# Patient Record
Sex: MALE | Race: OTHER | Employment: PART TIME | URBAN - METROPOLITAN AREA
[De-identification: names, ages, dates, MRNs, and addresses within clinical notes are randomized per-mention and may not be internally consistent; named-entity substitution may affect disease eponyms.]

---

## 2019-10-19 ENCOUNTER — HOSPITAL ENCOUNTER (OUTPATIENT)
Dept: GENERAL RADIOLOGY | Age: 59
Discharge: HOME | End: 2019-10-21

## 2019-10-19 DIAGNOSIS — A15.9 TB (TUBERCULOSIS): ICD-10-CM

## 2019-10-19 PROCEDURE — 71046 X-RAY EXAM CHEST 2 VIEWS: CPT

## 2021-02-20 ENCOUNTER — APPOINTMENT (OUTPATIENT)
Dept: GENERAL RADIOLOGY | Age: 61
End: 2021-02-20

## 2021-02-20 ENCOUNTER — HOSPITAL ENCOUNTER (EMERGENCY)
Age: 61
Discharge: HOME | End: 2021-02-20
Attending: EMERGENCY MEDICINE

## 2021-02-20 VITALS
TEMPERATURE: 97.9 F | SYSTOLIC BLOOD PRESSURE: 122 MMHG | DIASTOLIC BLOOD PRESSURE: 67 MMHG | RESPIRATION RATE: 20 BRPM | HEART RATE: 71 BPM | OXYGEN SATURATION: 99 % | WEIGHT: 200 LBS

## 2021-02-20 DIAGNOSIS — J02.0 STREPTOCOCCAL SORE THROAT: ICD-10-CM

## 2021-02-20 DIAGNOSIS — J40 BRONCHITIS: Primary | ICD-10-CM

## 2021-02-20 LAB — STREP GRP A PCR: POSITIVE

## 2021-02-20 PROCEDURE — 99283 EMERGENCY DEPT VISIT LOW MDM: CPT

## 2021-02-20 PROCEDURE — 71046 X-RAY EXAM CHEST 2 VIEWS: CPT

## 2021-02-20 PROCEDURE — 87651 STREP A DNA AMP PROBE: CPT

## 2021-02-20 PROCEDURE — 6370000000 HC RX 637 (ALT 250 FOR IP): Performed by: EMERGENCY MEDICINE

## 2021-02-20 RX ORDER — AZITHROMYCIN 250 MG/1
TABLET, FILM COATED ORAL
Qty: 1 PACKET | Refills: 0 | Status: SHIPPED | OUTPATIENT
Start: 2021-02-20 | End: 2021-02-20

## 2021-02-20 RX ORDER — AMOXICILLIN AND CLAVULANATE POTASSIUM 875; 125 MG/1; MG/1
1 TABLET, FILM COATED ORAL 2 TIMES DAILY
Qty: 14 TABLET | Refills: 0 | Status: SHIPPED | OUTPATIENT
Start: 2021-02-20 | End: 2021-02-27

## 2021-02-20 RX ORDER — ONDANSETRON 4 MG/1
4 TABLET, ORALLY DISINTEGRATING ORAL ONCE
Status: COMPLETED | OUTPATIENT
Start: 2021-02-20 | End: 2021-02-20

## 2021-02-20 RX ADMIN — ONDANSETRON 4 MG: 4 TABLET, ORALLY DISINTEGRATING ORAL at 13:26

## 2021-02-20 ASSESSMENT — ENCOUNTER SYMPTOMS
SORE THROAT: 1
BACK PAIN: 0
COUGH: 1
ABDOMINAL PAIN: 0
SHORTNESS OF BREATH: 0
DIARRHEA: 0
NAUSEA: 0
VOMITING: 0

## 2021-02-20 ASSESSMENT — PAIN SCALES - GENERAL: PAINLEVEL_OUTOF10: 5

## 2021-02-20 ASSESSMENT — PAIN DESCRIPTION - LOCATION: LOCATION: THROAT

## 2021-02-20 NOTE — ED PROVIDER NOTES
[facility] ED  eMERGENCYdEPARTMENT eNCOUnter      Pt Name: Luli Kc  MRN: [id number]  [place] 1960  Date of evaluation: 2/20/2021  Bernardo Fields MD    CHIEF COMPLAINT           HPI  Luli Kc is a 64 y.o. male per chart review has no pmh presents to the ED with cough, sore throat, nausea. Pt notes gradual onset, moderate, constant, sore throat x 2 days. +Productive cough with yellow sputum. +N/-v.  Pt denies fever, cp, sob, dysuria, diarrhea. ROS  Review of Systems   Constitutional: Negative for activity change, chills and fever. HENT: Positive for sore throat. Negative for ear pain. Eyes: Negative for visual disturbance. Respiratory: Positive for cough. Negative for shortness of breath. Cardiovascular: Negative for chest pain, palpitations and leg swelling. Gastrointestinal: Negative for abdominal pain, diarrhea, nausea and vomiting. Genitourinary: Negative for dysuria. Musculoskeletal: Negative for back pain. Skin: Negative for rash. Neurological: Negative for dizziness and weakness. Except as noted above the remainder of the review of systems was reviewed and negative. PAST MEDICAL HISTORY   History reviewed. No pertinent past medical history. SURGICAL HISTORY     History reviewed. No pertinent surgical history. CURRENTMEDICATIONS       Previous Medications    No medications on file       ALLERGIES     Patient has no known allergies. FAMILY HISTORY     History reviewed. No pertinent family history.        SOCIAL HISTORY       Social History     Socioeconomic History    Marital status: Legally      Spouse name: None    Number of children: None    Years of education: None    Highest education level: None   Occupational History    None   Social Needs    Financial resource strain: None    Food insecurity     Worry: None     Inability: None    Transportation needs     Medical: None     Non-medical: None   Tobacco to person, place, and time. Psychiatric:         Mood and Affect: Mood normal.           MDM  65 yo male presents to the ED with sore throat, cough, nausea. Pt is afebrile, hemodynamically stable. Pt given PO zofran in the ED. Strep positive. CXR negative. Pt reassessed and feels much better. Pt able to tolerate PO apple juice. Pt educated about strep and bronchitis. Pt smokes. Pt given strep and bronchitis warning signs and will f/u with pcp. Pt understands plan. FINAL IMPRESSION      1. Bronchitis    2.  Acute pharyngitis, unspecified etiology          DISPOSITION/PLAN   DISPOSITION Decision To Discharge 02/20/2021 02:26:34 PM        DISCHARGE MEDICATIONS:  [unfilled]         Jaqueline Sosa MD(electronically signed)  Attending Emergency Physician            Jaqueline Sosa MD  02/20/21 [address], [place]  02/20/21 8949

## 2021-10-02 ENCOUNTER — HOSPITAL ENCOUNTER (INPATIENT)
Age: 61
LOS: 6 days | Discharge: HOME | DRG: 885 | End: 2021-10-08
Attending: PSYCHIATRY & NEUROLOGY | Admitting: PSYCHIATRY & NEUROLOGY
Payer: COMMERCIAL

## 2021-10-02 DIAGNOSIS — F33.3 SEVERE EPISODE OF RECURRENT MAJOR DEPRESSIVE DISORDER, WITH PSYCHOTIC FEATURES (HCC): Primary | ICD-10-CM

## 2021-10-02 DIAGNOSIS — F14.10 NONDEPENDENT COCAINE ABUSE (HCC): ICD-10-CM

## 2021-10-02 PROBLEM — F32.3 MAJOR DEPRESSION WITH PSYCHOTIC FEATURES (HCC): Status: ACTIVE | Noted: 2021-10-02

## 2021-10-02 LAB
ACETAMINOPHEN LEVEL: <5 UG/ML (ref 10–30)
ALBUMIN SERPL-MCNC: 4.2 G/DL (ref 3.5–4.6)
ALP BLD-CCNC: 90 U/L (ref 35–104)
ALT SERPL-CCNC: 19 U/L (ref 0–41)
AMPHETAMINE SCREEN, URINE: ABNORMAL
ANION GAP SERPL CALCULATED.3IONS-SCNC: 10 MEQ/L (ref 9–15)
AST SERPL-CCNC: 19 U/L (ref 0–40)
BACTERIA: NEGATIVE /HPF
BARBITURATE SCREEN URINE: ABNORMAL
BASOPHILS ABSOLUTE: 0.1 K/UL (ref 0–0.2)
BASOPHILS RELATIVE PERCENT: 0.6 %
BENZODIAZEPINE SCREEN, URINE: ABNORMAL
BILIRUB SERPL-MCNC: 0.6 MG/DL (ref 0.2–0.7)
BILIRUBIN URINE: NEGATIVE
BLOOD, URINE: NEGATIVE
BUN BLDV-MCNC: 18 MG/DL (ref 6–20)
CALCIUM SERPL-MCNC: 9 MG/DL (ref 8.5–9.9)
CANNABINOID SCREEN URINE: ABNORMAL
CHLORIDE BLD-SCNC: 102 MEQ/L (ref 95–107)
CHOLESTEROL, TOTAL: 157 MG/DL (ref 0–199)
CK MB: 5.6 NG/ML (ref 0–6.7)
CLARITY: CLEAR
CO2: 25 MEQ/L (ref 20–31)
COCAINE METABOLITE SCREEN URINE: POSITIVE
COLOR: ABNORMAL
CREAT SERPL-MCNC: 1.19 MG/DL (ref 0.7–1.2)
CREATINE KINASE-MB INDEX: 2.8 % (ref 0–3.5)
EOSINOPHILS ABSOLUTE: 0.1 K/UL (ref 0–0.7)
EOSINOPHILS RELATIVE PERCENT: 0.5 %
EPITHELIAL CELLS, UA: NORMAL /HPF (ref 0–5)
ETHANOL PERCENT: NORMAL G/DL
ETHANOL: <10 MG/DL (ref 0–0.08)
GFR AFRICAN AMERICAN: >60
GFR NON-AFRICAN AMERICAN: >60
GLOBULIN: 3.1 G/DL (ref 2.3–3.5)
GLUCOSE BLD-MCNC: 107 MG/DL (ref 70–99)
GLUCOSE URINE: NEGATIVE MG/DL
HCT VFR BLD CALC: 46.2 % (ref 42–52)
HDLC SERPL-MCNC: 60 MG/DL (ref 40–59)
HEMOGLOBIN: 15.3 G/DL (ref 14–18)
HYALINE CASTS: NORMAL /HPF (ref 0–5)
KETONES, URINE: 15 MG/DL
LDL CHOLESTEROL CALCULATED: 91 MG/DL (ref 0–129)
LEUKOCYTE ESTERASE, URINE: ABNORMAL
LYMPHOCYTES ABSOLUTE: 2 K/UL (ref 1–4.8)
LYMPHOCYTES RELATIVE PERCENT: 16 %
Lab: ABNORMAL
MCH RBC QN AUTO: 30.2 PG (ref 27–31.3)
MCHC RBC AUTO-ENTMCNC: 33.1 % (ref 33–37)
MCV RBC AUTO: 91.1 FL (ref 80–100)
METHADONE SCREEN, URINE: ABNORMAL
MONOCYTES ABSOLUTE: 1.2 K/UL (ref 0.2–0.8)
MONOCYTES RELATIVE PERCENT: 9.5 %
NEUTROPHILS ABSOLUTE: 9.4 K/UL (ref 1.4–6.5)
NEUTROPHILS RELATIVE PERCENT: 73.4 %
NITRITE, URINE: NEGATIVE
OPIATE SCREEN URINE: ABNORMAL
OXYCODONE URINE: ABNORMAL
PDW BLD-RTO: 14.5 % (ref 11.5–14.5)
PH UA: 5 (ref 5–9)
PHENCYCLIDINE SCREEN URINE: ABNORMAL
PLATELET # BLD: 303 K/UL (ref 130–400)
POTASSIUM SERPL-SCNC: 3.7 MEQ/L (ref 3.4–4.9)
PROPOXYPHENE SCREEN: ABNORMAL
PROTEIN UA: ABNORMAL MG/DL
RBC # BLD: 5.07 M/UL (ref 4.7–6.1)
RBC UA: NORMAL /HPF (ref 0–5)
SALICYLATE, SERUM: <0.3 MG/DL (ref 15–30)
SARS-COV-2, NAAT: NOT DETECTED
SODIUM BLD-SCNC: 137 MEQ/L (ref 135–144)
SPECIFIC GRAVITY UA: 1.03 (ref 1–1.03)
TOTAL CK: 202 U/L (ref 0–190)
TOTAL PROTEIN: 7.3 G/DL (ref 6.3–8)
TRIGL SERPL-MCNC: 32 MG/DL (ref 0–150)
URINE REFLEX TO CULTURE: ABNORMAL
UROBILINOGEN, URINE: 1 E.U./DL
WBC # BLD: 12.7 K/UL (ref 4.8–10.8)
WBC UA: NORMAL /HPF (ref 0–5)

## 2021-10-02 PROCEDURE — 82550 ASSAY OF CK (CPK): CPT

## 2021-10-02 PROCEDURE — 85025 COMPLETE CBC W/AUTO DIFF WBC: CPT

## 2021-10-02 PROCEDURE — 99285 EMERGENCY DEPT VISIT HI MDM: CPT

## 2021-10-02 PROCEDURE — 82077 ASSAY SPEC XCP UR&BREATH IA: CPT

## 2021-10-02 PROCEDURE — 1240000000 HC EMOTIONAL WELLNESS R&B

## 2021-10-02 PROCEDURE — 6370000000 HC RX 637 (ALT 250 FOR IP): Performed by: PHYSICIAN ASSISTANT

## 2021-10-02 PROCEDURE — 80307 DRUG TEST PRSMV CHEM ANLYZR: CPT

## 2021-10-02 PROCEDURE — 81001 URINALYSIS AUTO W/SCOPE: CPT

## 2021-10-02 PROCEDURE — 80179 DRUG ASSAY SALICYLATE: CPT

## 2021-10-02 PROCEDURE — 93005 ELECTROCARDIOGRAM TRACING: CPT | Performed by: PHYSICIAN ASSISTANT

## 2021-10-02 PROCEDURE — 80143 DRUG ASSAY ACETAMINOPHEN: CPT

## 2021-10-02 PROCEDURE — 80061 LIPID PANEL: CPT

## 2021-10-02 PROCEDURE — 82553 CREATINE MB FRACTION: CPT

## 2021-10-02 PROCEDURE — 80053 COMPREHEN METABOLIC PANEL: CPT

## 2021-10-02 PROCEDURE — 36415 COLL VENOUS BLD VENIPUNCTURE: CPT

## 2021-10-02 RX ORDER — HALOPERIDOL 5 MG/ML
5 INJECTION INTRAMUSCULAR EVERY 6 HOURS PRN
Status: DISCONTINUED | OUTPATIENT
Start: 2021-10-02 | End: 2021-10-08 | Stop reason: HOSPADM

## 2021-10-02 RX ORDER — ACETAMINOPHEN 325 MG/1
650 TABLET ORAL EVERY 4 HOURS PRN
Status: DISCONTINUED | OUTPATIENT
Start: 2021-10-02 | End: 2021-10-08 | Stop reason: HOSPADM

## 2021-10-02 RX ORDER — HALOPERIDOL 5 MG
5 TABLET ORAL EVERY 6 HOURS PRN
Status: DISCONTINUED | OUTPATIENT
Start: 2021-10-02 | End: 2021-10-08 | Stop reason: HOSPADM

## 2021-10-02 RX ORDER — NICOTINE 21 MG/24HR
1 PATCH, TRANSDERMAL 24 HOURS TRANSDERMAL DAILY
Status: DISCONTINUED | OUTPATIENT
Start: 2021-10-02 | End: 2021-10-08 | Stop reason: HOSPADM

## 2021-10-02 RX ORDER — HYDROXYZINE HYDROCHLORIDE 50 MG/ML
50 INJECTION, SOLUTION INTRAMUSCULAR EVERY 6 HOURS PRN
Status: DISCONTINUED | OUTPATIENT
Start: 2021-10-02 | End: 2021-10-08 | Stop reason: HOSPADM

## 2021-10-02 RX ORDER — POLYETHYLENE GLYCOL 3350 17 G/17G
17 POWDER, FOR SOLUTION ORAL DAILY PRN
Status: DISCONTINUED | OUTPATIENT
Start: 2021-10-02 | End: 2021-10-08 | Stop reason: HOSPADM

## 2021-10-02 RX ORDER — LORAZEPAM 1 MG/1
1 TABLET ORAL ONCE
Status: COMPLETED | OUTPATIENT
Start: 2021-10-02 | End: 2021-10-02

## 2021-10-02 RX ORDER — HYDROXYZINE PAMOATE 50 MG/1
50 CAPSULE ORAL EVERY 6 HOURS PRN
Status: DISCONTINUED | OUTPATIENT
Start: 2021-10-02 | End: 2021-10-08 | Stop reason: HOSPADM

## 2021-10-02 RX ORDER — TRAZODONE HYDROCHLORIDE 50 MG/1
50 TABLET ORAL NIGHTLY PRN
Status: DISCONTINUED | OUTPATIENT
Start: 2021-10-02 | End: 2021-10-04

## 2021-10-02 RX ADMIN — LORAZEPAM 1 MG: 1 TABLET ORAL at 15:03

## 2021-10-02 ASSESSMENT — SLEEP AND FATIGUE QUESTIONNAIRES
SLEEP PATTERN: NORMAL
DO YOU HAVE DIFFICULTY SLEEPING: NO
AVERAGE NUMBER OF SLEEP HOURS: 6
DO YOU USE A SLEEP AID: NO

## 2021-10-02 ASSESSMENT — ENCOUNTER SYMPTOMS
NAUSEA: 0
CHOKING: 0
VOMITING: 0
SORE THROAT: 0
RHINORRHEA: 0
EYE DISCHARGE: 0
COUGH: 0
DIARRHEA: 0
SHORTNESS OF BREATH: 0
ABDOMINAL DISTENTION: 0
COLOR CHANGE: 0
ABDOMINAL PAIN: 0
CONSTIPATION: 0

## 2021-10-02 ASSESSMENT — PATIENT HEALTH QUESTIONNAIRE - PHQ9
SUM OF ALL RESPONSES TO PHQ QUESTIONS 1-9: 14
SUM OF ALL RESPONSES TO PHQ QUESTIONS 1-9: 14

## 2021-10-02 NOTE — ED NOTES
No one is available to take a report or give a bed on [facility] in report for the unit will need to call for a bed and a report on pt.      Lory Melgar RN  10/02/21 1910

## 2021-10-02 NOTE — ED NOTES
Provisional Diagnosis:  Substance Induced Mood D/O  Psychosis Unspecified      Psychosocial and Contextual Factors:  Pt was told he had to leave his apartment yesterday, the landlord Pt states he left all of his belongings there, \"I didn't have that much to begin with he the landlord told me to leave, I hadn't paid my rent in a month. \"  \"I left everything there, I just wanted to kill myself after I left my apartment. \"  \"I had lived there for two and half years. \"  \"I graduated from [employer] in [place] in 1982. \"  No other schooling, no . 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 \"  Pt is  from his wife for the past 10 years. \"  Pt has no children. C-SSRS Summary:     Patient: C-SSRS Suicide Screening  1) Within the past month, have you wished you were dead or wished you could go to sleep and not wake up? : Yes  2) Have you actually had any thoughts of killing yourself? : Yes  3) Have you been thinking about how you might kill yourself? : Yes  4) Have you had these thoughts and had some intention of acting on them? : Yes  5) Have you started to work out or worked out the details of how to kill yourself? Do you intend to carry out this plan? : Yes (\"I have suicidal thoughts for a month on and off and past week I thought about cutting my wrists. \"  \"I didn't cut myself no but wanted to today. \")  6) Have you ever done anything, started to do anything, or prepared to do anything to end your life?: Yes (\"Ten years ago I was on the bridge and was going to jump, God stopped me, I have had two other attempts before over many more than 10 years ago. \"  \"My last attempt was over 10 years. \")  Did this occur within the past 3 months? : No    Family: Supportive family per pt    Agency: Past H/O psych services 7 years ago no psych hospitalizations per pt          Abuse Assessment  Physical Abuse:

## 2021-10-02 NOTE — ED NOTES
3 [facility] called for bed assignment and report. Berna Chery RN states will call back.         Joslyn Salomon RN  10/02/21 1919

## 2021-10-02 NOTE — ED NOTES
Chuy ECHAVARRIA at bedside. Patient pink slipped by Christine AGUERO.       Madelyn Houser RN  10/02/21 6900

## 2021-10-02 NOTE — ED NOTES
Informed pt that we need urine. Pt cooperated.  Urine collected     Patrciio Martinez [facility]  10/02/21 8349

## 2021-10-02 NOTE — ED NOTES
Georgianne Cap was given to the pt at the bedside, pt is quiet and cooperative with no problems and no C/O any kind expressed. Pt has even respirations, no cough, no respiratory distress, and no SOB noted.      John Darby RN  10/02/21 3431

## 2021-10-02 NOTE — ED NOTES
Talked with Dr. Nenita Cooper reviewed his arrival to ER, possible loss of his job was advised he had to leave his apartment by the landlord, reviewed his labs no home med's, no alcohol abuse, uses crack and has for years on and off, he is with S/I with a plan to cut his wrist, hearing voices. Reviewed pt's EKG, labs, and no home medications  Previous 3 suicide attempts last was 10 years ago and was going to jump off of a bridge, today wanting to cut his wrists. Per Dr. Nenita Cooper pt can be admitted to [facility] DX Major Depression.   No home medications, PRN's admit on a pink sheet     Dai Rosen RN  10/02/21 9369

## 2021-10-02 NOTE — ED NOTES
Report on the pt given to Zulema Quiroga RN regarding orders for the pt's admit to [facility] but needs a bed and report on pt needs given     John Darby RN  10/02/21 1925

## 2021-10-02 NOTE — ED NOTES
Pt was cooperative with lab and -ER staff were in the [facility] with lab staff. Pt was given a lunch at the bedside.      Tosin Kaplan RN  10/02/21 9097

## 2021-10-02 NOTE — ED NOTES
Pt is in the TV area watching TV quiet and cooperative ate well for his dinner drinking fluids     Shakir Webb RN  10/02/21 7791

## 2021-10-02 NOTE — ED NOTES
Explained COVID to the pt and completed his test and sent to the lab.   Pt was cooperative with completing the test.     Duane Olsen RN  10/02/21 0405

## 2021-10-02 NOTE — ED TRIAGE NOTES
Patient arrived via EMS with suicidal thoughts and drug addiction. Patient states that he is upset about his drug use. States that he smoked \"crack\" approximately one hour ago. Patient called EMS for suicidal ideations. Patient had a knife in his possession when squad arrived. Patient states that he thought about harming himself.

## 2021-10-02 NOTE — ED NOTES
Gave Dr Kelly Carter ambulance form to sign and disposition with DX:  Advised the pt is still waiting for [facility] to transport the pt to [facility], RN  10/02/21 0605

## 2021-10-02 NOTE — ED PROVIDER NOTES
constipation, diarrhea, nausea and vomiting. Genitourinary: Negative for difficulty urinating and dysuria. Musculoskeletal: Negative for arthralgias. Skin: Negative for color change. Neurological: Negative for dizziness, syncope, numbness and headaches. Psychiatric/Behavioral: Positive for suicidal ideas. Negative for agitation and confusion. Except as noted above the remainder of the review of systems was reviewed and negative. PAST MEDICAL HISTORY     Past Medical History:   Diagnosis Date    Psychiatric problem          SURGICALHISTORY     History reviewed. No pertinent surgical history. CURRENT MEDICATIONS       There are no discharge medications for this patient. ALLERGIES     Patient has no known allergies.     FAMILY HISTORY       Family History   Problem Relation Age of Onset    No Known Problems Mother     No Known Problems Father     No Known Problems Sister     No Known Problems Brother     No Known Problems Maternal Aunt     No Known Problems Maternal Uncle     No Known Problems Paternal Aunt     No Known Problems Paternal Uncle     No Known Problems Maternal Grandmother     No Known Problems Maternal Grandfather     No Known Problems Paternal Grandmother     No Known Problems Paternal Grandfather     No Known Problems Maternal Cousin     No Known Problems Paternal Cousin     No Known Problems Other           SOCIAL HISTORY       Social History     Socioeconomic History    Marital status: Legally      Spouse name: None    Number of children: 0    Years of education: 15    Highest education level: None   Occupational History    None   Tobacco Use    Smoking status: Current Every Day Smoker     Packs/day: 0.50     Types: Cigarettes    Smokeless tobacco: Never Used   Vaping Use    Vaping Use: Never used   Substance and Sexual Activity    Alcohol use: None    Drug use: Yes     Comment: uses crack daily    Sexual activity: None   Other Topics Concern    None   Social History Narrative    None     Social Determinants of Health     Financial Resource Strain:     Difficulty of Paying Living Expenses:    Food Insecurity:     Worried About Running Out of Food in the Last Year:     [address] in the Last Year:    Transportation Needs:     Lack of Transportation (Medical):  Lack of Transportation (Non-Medical):    Physical Activity:     Days of Exercise per Week:     Minutes of Exercise per Session:    Stress:     Feeling of Stress :    Social Connections:     Frequency of Communication with Friends and Family:     Frequency of Social Gatherings with Friends and Family:     Attends Hindu Services:     Active Member of Clubs or Organizations:     Attends Club or Organization Meetings:     Marital Status:    Intimate Partner Violence:     Fear of Current or Ex-Partner:     Emotionally Abused:     Physically Abused:     Sexually Abused:        SCREENINGS      @FLOW([id number])@      PHYSICAL EXAM    (up to 7 for level 4, 8 or more for level 5)     ED Triage Vitals [10/02/21 1343]   BP Temp Temp Source Pulse Resp SpO2 Height Weight   (!) 157/99 98.1 °F (36.7 °C) Oral 94 18 97 % 6' (1.829 m) 200 lb (90.7 kg)       Physical Exam  Vitals and nursing note reviewed. Constitutional:       General: He is not in acute distress. Appearance: He is well-developed. He is not ill-appearing, toxic-appearing or diaphoretic. HENT:      Head: Normocephalic. Nose: No congestion. Mouth/Throat:      Mouth: Mucous membranes are moist.      Pharynx: No oropharyngeal exudate or posterior oropharyngeal erythema. Eyes:      Extraocular Movements: Extraocular movements intact. Conjunctiva/sclera: Conjunctivae normal.      Pupils: Pupils are equal, round, and reactive to light. Neck:      Vascular: No JVD. Trachea: No tracheal deviation. Cardiovascular:      Rate and Rhythm: Normal rate. Pulses: Normal pulses.       Heart sounds: Normal heart sounds. No murmur heard. No friction rub. No gallop. Pulmonary:      Effort: Pulmonary effort is normal. No tachypnea, accessory muscle usage, respiratory distress or retractions. Breath sounds: No stridor. No wheezing, rhonchi or rales. Chest:      Chest wall: No tenderness. Abdominal:      General: Abdomen is flat. Bowel sounds are normal. There is no distension or abdominal bruit. Palpations: There is no shifting dullness, fluid wave, hepatomegaly, splenomegaly, mass or pulsatile mass. Tenderness: There is no abdominal tenderness. There is no right CVA tenderness, left CVA tenderness, guarding or rebound. Negative signs include Francis's sign, Rovsing's sign and McBurney's sign. Musculoskeletal:         General: No deformity. Cervical back: Normal range of motion and neck supple. No rigidity. Skin:     General: Skin is warm and dry. Capillary Refill: Capillary refill takes less than 2 seconds. Coloration: Skin is not jaundiced. Neurological:      General: No focal deficit present. Mental Status: He is alert and oriented to person, place, and time. Mental status is at baseline. Cranial Nerves: No cranial nerve deficit. Sensory: No sensory deficit. Motor: No weakness. Coordination: Coordination normal.   Psychiatric:         Mood and Affect: Mood normal.      Comments: Patient, cooperative, increasing thoughts of wanting to commit suicide, but no specific plan.   Patient states his thoughts of suicide are related to his substance abuse issues with crack cocaine         DIAGNOSTIC RESULTS     EKG: All EKG's are interpreted by the Emergency Department Physician who either signs or Co-signsthis chart in the absence of a cardiologist.        RADIOLOGY:   Non-plain filmimages such as CT, Ultrasound and MRI are read by the radiologist. Plain radiographic images are visualized and preliminarily interpreted by the emergency physician with the below findings:        Interpretation per the Radiologist below, if available at the time ofthis note:    No orders to display         ED BEDSIDE ULTRASOUND:   Performed by ED Physician - none    LABS:  Labs Reviewed   COMPREHENSIVE METABOLIC PANEL - Abnormal; Notable for the following components:       Result Value    Glucose 107 (*)     All other components within normal limits   URINE DRUG SCREEN - Abnormal; Notable for the following components:    Cocaine Metabolite Screen, Urine POSITIVE (*)     All other components within normal limits   CBC WITH AUTO DIFFERENTIAL - Abnormal; Notable for the following components:    WBC 12.7 (*)     Neutrophils Absolute 9.4 (*)     Monocytes Absolute 1.2 (*)     All other components within normal limits   URINE RT REFLEX TO CULTURE - Abnormal; Notable for the following components:    Color, UA DARK YELLOW (*)     Ketones, Urine 15 (*)     Protein, UA TRACE (*)     Leukocyte Esterase, Urine TRACE (*)     All other components within normal limits   CK - Abnormal; Notable for the following components: Total  (*)     All other components within normal limits   SALICYLATE LEVEL - Abnormal; Notable for the following components:    Salicylate, Serum <0.7 (*)     All other components within normal limits   ACETAMINOPHEN LEVEL - Abnormal; Notable for the following components:    Acetaminophen Level <5 (*)     All other components within normal limits   LIPID PANEL - Abnormal; Notable for the following components:    HDL 60 (*)     All other components within normal limits   COVID-19, RAPID   ETHANOL   CKMB & RELATIVE PERCENT   MICROSCOPIC URINALYSIS       All other labs were within normal range or not returned as of this dictation.     EMERGENCY DEPARTMENT COURSE and DIFFERENTIAL DIAGNOSIS/MDM:   Vitals:    Vitals:    10/02/21 1343 10/02/21 2058 10/02/21 2112   BP: (!) 157/99  (!) 142/88   Pulse: 94 94 94   Resp: 18  18   Temp: 98.1 °F (36.7 °C)  98.1 °F (36.7 °C) TempSrc: Oral  Oral   SpO2: 97%  97%   Weight: 200 lb (90.7 kg)  200 lb (90.7 kg)   Height: 6' (1.829 m)  6' (1.829 m)            MDM    CRITICAL CARE TIME   Total Critical Care time was 0 minutes, excluding separately reportableprocedures. There was a high probability of clinicallysignificant/life threatening deterioration in the patient's condition which required my urgent intervention. CONSULTS:  IP CONSULT TO HOSPITALIST  IP CONSULT TO SOCIAL WORK    PROCEDURES:  Unless otherwise noted below, none     Procedures    FINAL IMPRESSION      1. Severe episode of recurrent major depressive disorder, with psychotic features ([facility] [address]    2. Nondependent cocaine abuse ([facility] [address]          DISPOSITION/PLAN   DISPOSITION Admitted 10/02/2021 07:08:37 PM      PATIENT REFERRED TO:  No follow-up provider specified. DISCHARGE MEDICATIONS:  There are no discharge medications for this patient.          (Please note that portions of this note were completed with a voice recognition program.  Efforts were made to edit the dictations but occasionally words are mis-transcribed.)    Aung Leggett PA-C (electronically signed)  Attending Emergency Physician         Aung Leggett PA-C  10/03/21 0779

## 2021-10-02 NOTE — PROGRESS NOTES
Report per Victorio Lesch. Pt called 911 and came to the hospital for S/I and hearing voices. Pt admits to using Cocaine/crack before he came to the ER. Pt has been using Crack for \"a lot of years on and off. \"  Pt has no delusions, no paranoia, hearing voices that tell him to kill himself and hurt \"\"others\"  \"I don't want to hurt anyone but the voices tell me to cut my wrists and hurt other people but I don't want to harm anyone but yes kill myself. \"  Pt has no H/O abuse of alcohol drinks occasionally. He was negative for alcohol screen and positive for Cocaine. Pt thinks he lost his job of two years as he did not work past three days and he was \"Kicked out of my apartment yesterday. \"  No other psychosis noted other than A/hallucinations, no phobia's, no delusions, no V/hallucinations.  Pt refused to talk with [facility]

## 2021-10-03 PROCEDURE — 1240000000 HC EMOTIONAL WELLNESS R&B

## 2021-10-03 PROCEDURE — 6370000000 HC RX 637 (ALT 250 FOR IP): Performed by: PSYCHIATRY & NEUROLOGY

## 2021-10-03 RX ORDER — MIRTAZAPINE 15 MG/1
15 TABLET, FILM COATED ORAL NIGHTLY
Status: DISCONTINUED | OUTPATIENT
Start: 2021-10-03 | End: 2021-10-08 | Stop reason: HOSPADM

## 2021-10-03 RX ADMIN — ACETAMINOPHEN 650 MG: 325 TABLET ORAL at 08:33

## 2021-10-03 RX ADMIN — MIRTAZAPINE 15 MG: 15 TABLET, FILM COATED ORAL at 20:44

## 2021-10-03 ASSESSMENT — LIFESTYLE VARIABLES: HISTORY_ALCOHOL_USE: NO

## 2021-10-03 ASSESSMENT — PAIN SCALES - GENERAL
PAINLEVEL_OUTOF10: 5
PAINLEVEL_OUTOF10: 8

## 2021-10-03 NOTE — PROGRESS NOTES
Pt asks for Tylenol for back pain of an 8/10.   Patient refuses nicotine patch in the am. Electronically signed by Kira Monterroso LPN on 75/8/1158 at 8:13 AM

## 2021-10-03 NOTE — PROGRESS NOTES
Patient denies current SI/HI or AVH. Patient flat sad appearing. Patient does come out to the dining area to eat. Patient out watching tv at times. Patient dep and anx, moving legs continually. Patient states he was kicked out of his apartment. Patient able to make needs known.  Electronically signed by Keenan Ac LPN on 79/3/3838 at 54:74 PM

## 2021-10-03 NOTE — PROGRESS NOTES
Patient is 64year old male admitted to [address] via [facility] ED,  A&Ox4, ambulates with steady gait. Patient currently denies suicidal/homicidal ideation and contracts for safety. Patient currently denies A/H, but states he hears voices when smoking crack cocaine. Patient denies V/H. Pt called 911 and came to the hospital for S/I and hearing voices. Pt admits to using Cocaine/crack before he came to the ER. Pt has been using Crack for \"a lot of years on and off. \"  Pt has no delusions, no paranoia, hearing voices that tell him to kill himself and hurt \"\"others\"  \"I don't want to hurt anyone but the voices tell me to cut my wrists and hurt other people but I don't want to harm anyone but yes kill myself. \"  Pt has no H/O abuse of alcohol drinks occasionally. He was negative for alcohol screen and positive for Cocaine. Pt thinks he lost his job of two years as he did not work past three days and he was \"Kicked out of my apartment yesterday. \"  No other psychosis noted other than A/hallucinations, no phobia's, no delusions, no V/hallucinations. Pt refused to talk with [facility]. Pt was told he had to leave his apartment yesterday, the landlord Pt states he left all of his belongings there, \"I didn't have that much to begin with he the landlord told me to leave, I hadn't paid my rent in a month. \"  \"I left everything there, I just wanted to kill myself after I left my apartment. \"  \"I had lived there for two and half years. \"  \"I graduated from [employer] in [place] in 1982. \"  No other schooling, no . 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 \"  Pt is  from his wife for the past 10 years. \"  Pt has no children. Patient given brief orientation ti unit and assigned room #384.

## 2021-10-03 NOTE — PROGRESS NOTES
Appears bright, talkative, future oriented. Hoping to get help from social work re:housing. Out on unit watching TV, neat and clean in appearance, sleeping well, good appetite. Pleasant.

## 2021-10-03 NOTE — CONSULTS
status: Current Every Day Smoker     Packs/day: 0.50     Types: Cigarettes    Smokeless tobacco: Never Used   Vaping Use    Vaping Use: Never used   Substance and Sexual Activity    Alcohol use: Not on file    Drug use: Yes     Comment: uses crack daily    Sexual activity: Not on file   Other Topics Concern    Not on file   Social History Narrative    Not on file     Social Determinants of Health     Financial Resource Strain:     Difficulty of Paying Living Expenses:    Food Insecurity:     Worried About Running Out of Food in the Last Year:     Ran Out of Food in the Last Year:    Transportation Needs:     Lack of Transportation (Medical):      Lack of Transportation (Non-Medical):    Physical Activity:     Days of Exercise per Week:     Minutes of Exercise per Session:    Stress:     Feeling of Stress :    Social Connections:     Frequency of Communication with Friends and Family:     Frequency of Social Gatherings with Friends and Family:     Attends Nondenominational Services:     Active Member of Clubs or Organizations:     Attends Club or Organization Meetings:     Marital Status:    Intimate Partner Violence:     Fear of Current or Ex-Partner:     Emotionally Abused:     Physically Abused:     Sexually Abused:      MEDICATIONS:    Current Facility-Administered Medications   Medication Dose Route Frequency Provider Last Rate Last Admin    influenza quadrivalent split vaccine (FLUZONE;FLUARIX;FLULAVAL;AFLURIA) injection 0.5 mL  0.5 mL IntraMUSCular Prior to discharge Kaur Catherine MD        mirtazapine (REMERON) tablet 15 mg  15 mg Oral Nightly Kaur Catherine MD        acetaminophen (TYLENOL) tablet 650 mg  650 mg Oral Q4H PRN Kaur Catherine MD   650 mg at 10/03/21 6499    polyethylene glycol (GLYCOLAX) packet 17 g  17 g Oral Daily PRN Kaur Catherine MD        nicotine (NICODERM CQ) 21 MG/24HR 1 patch  1 patch TransDERmal Daily Kaur Catherine MD        haloperidol lactate (HALDOL) injection 5 mg  5 mg IntraMUSCular Q6H PRN Tamara Jeffries MD        Or    haloperidol (HALDOL) tablet 5 mg  5 mg Oral Q6H PRN Tamara Jeffries MD        hydrOXYzine (VISTARIL) injection 50 mg  50 mg IntraMUSCular Q6H PRN Tamara Jeffries MD        Or    hydrOXYzine (VISTARIL) capsule 50 mg  50 mg Oral Q6H PRN Tamara Jeffries MD        traZODone (DESYREL) tablet 50 mg  50 mg Oral Nightly PRN Tamara Jeffries MD           ALLERGIES: Patient has no known allergies. REVIEW OF SYSTEM:   ROS as noted in HPI, 12 point ROS reviewed and otherwise negative. OBJECTIVE  PHYSICAL EXAM: /71 Comment: standing 124/95  Pulse 90   Temp 98.1 °F (36.7 °C) (Oral)   Resp 16   Ht 6' (1.829 m)   Wt 200 lb (90.7 kg)   SpO2 97%   BMI 27.12 kg/m²   CONSTITUTIONAL:  awake, alert, cooperative, no apparent distress, and appears stated age  EYES:  Lids and lashes normal, pupils equal, round and reactive to light, extra ocular muscles intact, sclera clear, conjunctiva normal  ENT:  Normocephalic, without obvious abnormality, atraumatic, sinuses nontender on palpation, external ears without lesions, oral pharynx with moist mucus membranes, tonsils without erythema or exudates, gums normal and good dentition. NECK:  Supple, symmetrical, trachea midline  LUNGS:  Clear to auscultation bilaterally, no crackles or wheezing  CARDIOVASCULAR:  Regular rate and rhythm, normal S1 and S2  ABDOMEN:  Normal bowel sounds, soft, non-distended, non-tender  MUSCULOSKELETAL:  There is no redness, warmth, or swelling of the joints. NEUROLOGIC:  Awake, alert, oriented to name, place and time. Cranial nerves II-XII are grossly intact. Motor is 5 out of 5 bilaterally. Sensory is intact.  gait is normal.  SKIN:  Warm and dry    DATA:     Diagnostic tests reviewed for today's visit:    Most recent labs and imaging results reviewed.      ASSESSMENT AND PLAN    Major depression with psychotic features [facility])  Patient admitted to behavorial health

## 2021-10-03 NOTE — H&P
Department of Psychiatry  History and Physical - Adult        Behavioral Services  Medicare Certification Upon Admission    I certify that this patient's inpatient psychiatric hospital admission is medically necessary for:    [x] (1) Treatment which could reasonably be expected to improve this patient's condition,       [x] (2) Or for diagnostic study;     AND     [x](2) The inpatient psychiatric services are provided while the individual is under the care of a physician and are included in the individualized plan of care. Estimated length of stay/service 5-7 days    Plan for post-hospital care follow up with outpatient provider        CHIEF COMPLAINT:  Depressed mood, suicidal ideation, auditory hallucinations    History obtained from:  patient, electronic medical record    Patient was seen after discussing with the treatment team and reviewing the chart\    CIRCUMSTANCES OF ADMISSION:   Mr. Dioni Fields is a 64 y.o. male with a history of addiction who presented to the ER with c/o depressed mood, suicidal ideation and auditory hallucinations. Per ER notes, \"Pt called 911 and came to the hospital for S/I and hearing voices. Pt admits to using Cocaine/crack before he came to the ER. Pt has been using Crack for \"a lot of years on and off. \"  Pt has no delusions, no paranoia, hearing voices that tell him to kill himself and hurt \"\"others\"  \"I don't want to hurt anyone but the voices tell me to cut my wrists and hurt other people but I don't want to harm anyone but yes kill myself. \"  Pt has no H/O abuse of alcohol drinks occasionally. He was negative for alcohol screen and positive for Cocaine. Pt thinks he lost his job of two years as he did not work past three days and he was \"Kicked out of my apartment yesterday. \"  No other psychosis noted other than A/hallucinations, no phobia's, no delusions, no V/hallucinations. Pt refused to talk with [facility]\"     HISTORY OF PRESENT ILLNESS:      The patient is a 64 y.o. male with significant past history of drug use, who presented to the ER with c/o depressed mood and suicidal ideation as well as auditory hallucinations. When interviewed today, the patient said he had \"used too much crack' and \"wanted to kill himself\". He said he had a lot of problems, lost his house and his job. He has been depressed for about 6 months. He said he has been having suicidal ideation \"every time he smokes crack\", but \"can't control himself. He said his sleep is \"not good\" because he wakes up a lot and that his appetite is normal. He admitted to having auditory hallucinations, \"sometimes\", telling him to harm himself and others, the last time being 2 days ago, as well as visual hallucinations of \"flashes\" like someone is passing by. He said he has been \"seeing the Devil\" in his sleep. He said he does get paranoid when he gets high on crack cocaine. He denied homicidal ideation. Stressors: no    The patient is not currently receiving care for the above psychiatric illness. Medications Prior to Admission:   No medications prior to admission. Compliance: n/a    Psychiatric Review of Systems       Depression: yes     Elle or Hypomania:  no     Panic Attacks:  no     Phobias:  no     Obsessions and Compulsions:  no     PTSD : no     Hallucinations:  yes - auditory and visual as above     Delusions:  yes - paranoia as above    Substance Abuse History:  ETOH: yes   Marijuana: yes  Opiates: no  Other Drugs: crack cocaine      Past Psychiatric History:  Prior Diagnosis:  n/a  Psychiatrist: no  Therapist:no  Hospitalization: no  Hx of Suicidal Attempts: no  Hx of violence:  no  ECT: no  Previous discontinued Psychiatric Med Trials: n/a    Past Medical History:        Diagnosis Date    Psychiatric problem        Past Surgical History:    History reviewed. No pertinent surgical history. Allergies:   Patient has no known allergies.     Family History  Family History   Problem Relation Age of Onset    No Known Problems Mother     No Known Problems Father     No Known Problems Sister     No Known Problems Brother     No Known Problems Maternal Aunt     No Known Problems Maternal Uncle     No Known Problems Paternal Aunt     No Known Problems Paternal Uncle     No Known Problems Maternal Grandmother     No Known Problems Maternal Grandfather     No Known Problems Paternal Grandmother     No Known Problems Paternal Grandfather     No Known Problems Maternal Cousin     No Known Problems Paternal Cousin     No Known Problems Other          Social History:  Born and Raised: [facility]  Education: [employer]  Employment: Unemployed, seeking work  Relationships:   Children: no children  Current Support: none    Legal Hx: no pending charges  Access to weapons?:  No      EXAMINATION:    REVIEW OF SYSTEMS:    ROS:  [x] All negative/unchanged except if checked.  Explain positive(checked items) below:  [] Constitutional  [] Eyes  [] Ear/Nose/Mouth/Throat  [] Respiratory  [] CV  [] GI  []   [] Musculoskeletal  [] Skin/Breast  [] Neurological  [] Endocrine  [] Heme/Lymph  [] Allergic/Immunologic    Explanation:     Vitals:  /71 Comment: standing 124/95  Pulse 90   Temp 98.1 °F (36.7 °C) (Oral)   Resp 16   Ht 6' (1.829 m)   Wt 200 lb (90.7 kg)   SpO2 97%   BMI 27.12 kg/m²      Neurologic Exam:   Muscle Strength & Tone: full ROM  Gait: normal gait   Involuntary Movements: No    Mental Status Examination:    Level of consciousness:  within normal limits   Appearance:  hospital attire, poor grooming and poor hygiene  Behavior/Motor:  psychomotor retardation  Attitude toward examiner:  cooperative and good eye contact  Speech:  spontaneous, normal rate, normal volume and well articulated   Mood: constricted, decreased range and depressed  Affect:  mood congruent and blunted  Thought processes:  linear, goal directed and coherent   Thought content:  Homocidal ideation denies  Suicidal Ideation:  active  Delusions:  paranoid  Perceptual Disturbance:  auditory and visual  Cognition:  oriented to person, place, and time   Concentration distractible  Memory intact  Insight fair   Judgement fair   Fund of Knowledge limited          DIAGNOSIS:  Depressive disorder, NOS r/o Cocaine Induced Mood and psychotic disorder  Stimulant (cocaine) use disorder          RISK ASSESSMENT:    SUICIDE RISK ASSESSMENT: high  HOMICIDE: low  AGITATION/VIOLENCE: low  ELOPEMENT: low    LABS: REVIEWED TODAY:  Recent Labs     10/02/21  1424   WBC 12.7*   HGB 15.3        Recent Labs     10/02/21  1424      K 3.7      CO2 25   BUN 18   CREATININE 1.19   GLUCOSE 107*     Recent Labs     10/02/21  1424   BILITOT 0.6   ALKPHOS 90   AST 19   ALT 19     Lab Results   Component Value Date    LABAMPH Neg 10/02/2021    BARBSCNU Neg 10/02/2021    LABBENZ Neg 10/02/2021    LABMETH Neg 10/02/2021    OPIATESCREENURINE Neg 10/02/2021    PHENCYCLIDINESCREENURINE Neg 10/02/2021    ETOH <10 10/02/2021     No results found for: TSH, FREET4  No results found for: LITHIUM  No results found for: VALPROATE, CBMZ  No results found for: LITHIUM, VALPROATE    FURTHER LABS ORDERED :      Radiology   No results found. EKG: TRACING REVIEWED    TREATMENT PLAN:    Risk Management:  close watch and suicide risk    Collateral Information:  Will obtain collateral information from the family or friends. Will obtain medical records as appropriate from out patient providers  Will consult the hospitalist for a physical exam to rule out any co-morbid physical condition.     Home medication Reconciled       New Medications started during this admission :    Current Facility-Administered Medications   Medication Dose Route Frequency Provider Last Rate Last Admin    influenza quadrivalent split vaccine (FLUZONE;FLUARIX;FLULAVAL;AFLURIA) injection 0.5 mL  0.5 mL IntraMUSCular Prior to discharge Lyly Caal MD        mirtazapine (Darreld Kocher)

## 2021-10-03 NOTE — PROGRESS NOTES
Patient arrived to [address] [place] [address] via wheelchair and was passively searched for contraband. No contraband found. Patient skin assessed for integrity by this writer and Rani OCONNELL. Skin intact.

## 2021-10-03 NOTE — PROGRESS NOTES
Patient did not attend group despite staff encouragement.   Electronically signed by Denita Jordan on 10/2/2021 at 10:24 PM

## 2021-10-03 NOTE — GROUP NOTE
Group Therapy Note    Date: 10/3/2021    Group Start Time: 1100  Group End Time: 8504  Group Topic: Group Therapy    [facility]    SHANE Cisse        Group Therapy Note    Attendees: 12         Patient's Goal:  To participate in a goal oriented group. Notes:  Patient's stated his goal is be happy/alvarez. Status After Intervention:  Unchanged    Participation Level: Active Listener    Participation Quality: Appropriate      Speech:  normal      Thought Process/Content: Logical      Affective Functioning: Congruent      Mood: depressed      Level of consciousness:  Alert      Response to Learning: Progressing to goal      Endings: None Reported    Modes of Intervention: Education      Discipline Responsible: /Counselor      Signature:   SHANE Cisse

## 2021-10-03 NOTE — PROGRESS NOTES
Patient did not attend group despite staff encouragement.   Electronically signed by Kathy King on 10/2/2021 at 10:23 PM

## 2021-10-04 LAB
EKG ATRIAL RATE: 87 BPM
EKG P AXIS: 10 DEGREES
EKG P-R INTERVAL: 112 MS
EKG Q-T INTERVAL: 384 MS
EKG QRS DURATION: 82 MS
EKG QTC CALCULATION (BAZETT): 462 MS
EKG R AXIS: -9 DEGREES
EKG T AXIS: 56 DEGREES
EKG VENTRICULAR RATE: 87 BPM

## 2021-10-04 PROCEDURE — 93010 ELECTROCARDIOGRAM REPORT: CPT | Performed by: INTERNAL MEDICINE

## 2021-10-04 PROCEDURE — 6370000000 HC RX 637 (ALT 250 FOR IP): Performed by: PSYCHIATRY & NEUROLOGY

## 2021-10-04 PROCEDURE — 99232 SBSQ HOSP IP/OBS MODERATE 35: CPT | Performed by: PSYCHIATRY & NEUROLOGY

## 2021-10-04 PROCEDURE — 1240000000 HC EMOTIONAL WELLNESS R&B

## 2021-10-04 RX ORDER — TRAZODONE HYDROCHLORIDE 50 MG/1
50 TABLET ORAL NIGHTLY
Status: DISCONTINUED | OUTPATIENT
Start: 2021-10-04 | End: 2021-10-04

## 2021-10-04 RX ORDER — QUETIAPINE FUMARATE 50 MG/1
50 TABLET, FILM COATED ORAL NIGHTLY
Status: DISCONTINUED | OUTPATIENT
Start: 2021-10-04 | End: 2021-10-08 | Stop reason: HOSPADM

## 2021-10-04 RX ADMIN — MIRTAZAPINE 15 MG: 15 TABLET, FILM COATED ORAL at 21:23

## 2021-10-04 RX ADMIN — QUETIAPINE FUMARATE 50 MG: 50 TABLET ORAL at 21:23

## 2021-10-04 NOTE — GROUP NOTE
Group Therapy Note    Date: 10/3/2021    Group Start Time: 2015  Group End Time: 2030  Group Topic: Wrap-Up    [facility]    Marguerite Arguelles        Group Therapy Note    Attendees: 8/18         Patient's Goal:  No goal    Notes:  Patient shared being happy the [employer] won today.     Status After Intervention:  Unchanged    Participation Level: Interactive    Participation Quality: Appropriate, Attentive and Sharing      Speech:  pressured      Thought Process/Content: Linear      Affective Functioning: Congruent      Mood: euthymic      Level of consciousness:  Alert and Attentive      Response to Learning: Progressing to goal      Endings: None Reported    Modes of Intervention: Support      Discipline Responsible: [employer]      Signature:  Marguerite Arguelles

## 2021-10-04 NOTE — PROGRESS NOTES
Pt. refused to attend the 1000 skills group, despite staff encouragement.  Electronically signed by Negro Rai on 10/4/2021 at 11:11 AM

## 2021-10-04 NOTE — PROGRESS NOTES
[facility]. FOLLOW-UP NOTE       10/4/2021     Patient was seen and examined in person, Chart reviewed   Patient's case discussed with staff/team    Chief Complaint: Depression    Interim History:     Pt report feeling depressed hopeless and worthless  Not getting out of his room  Withdrawn and suicidal   Pt is w/d from crack and alcohol  Homeless major stressor  Pt is not interested in rehab  Appetite:   [] Normal/Unchanged  [] Increased  [x] Decreased      Sleep:       [] Normal/Unchanged  [] Fair       [x] Poor              Energy:    [] Normal/Unchanged  [] Increased  [x] Decreased        SI [x] Present  [] Absent    HI  []Present  [x] Absent     Aggression:  [] yes  [] no    Patient is [] able  [] unable to CONTRACT FOR SAFETY     PAST MEDICAL/PSYCHIATRIC HISTORY:   Past Medical History:   Diagnosis Date    Psychiatric problem        FAMILY/SOCIAL HISTORY:  Family History   Problem Relation Age of Onset    No Known Problems Mother     No Known Problems Father     No Known Problems Sister     No Known Problems Brother     No Known Problems Maternal Aunt     No Known Problems Maternal Uncle     No Known Problems Paternal Aunt     No Known Problems Paternal Uncle     No Known Problems Maternal Grandmother     No Known Problems Maternal Grandfather     No Known Problems Paternal Grandmother     No Known Problems Paternal Grandfather     No Known Problems Maternal Cousin     No Known Problems Paternal Cousin     No Known Problems Other      Social History     Socioeconomic History    Marital status: Legally      Spouse name: Not on file    Number of children: 0    Years of education: 15    Highest education level: Not on file   Occupational History    Not on file   Tobacco Use    Smoking status: Current Every Day Smoker     Packs/day: 0.50     Types: Cigarettes    Smokeless tobacco: Never Used   Vaping Use    Vaping Use: Never used   Substance and Sexual Activity    Alcohol use: Not on file    Drug use: Yes     Comment: uses crack daily    Sexual activity: Not on file   Other Topics Concern    Not on file   Social History Narrative    Not on file     Social Determinants of Health     Financial Resource Strain:     Difficulty of Paying Living Expenses:    Food Insecurity:     Worried About Running Out of Food in the Last Year:     [address] in the Last Year:    Transportation Needs:     Lack of Transportation (Medical):  Lack of Transportation (Non-Medical):    Physical Activity:     Days of Exercise per Week:     Minutes of Exercise per Session:    Stress:     Feeling of Stress :    Social Connections:     Frequency of Communication with Friends and Family:     Frequency of Social Gatherings with Friends and Family:     Attends [employer] Services:     Active Member of Clubs or Organizations:     Attends Club or Organization Meetings:     Marital Status:    Intimate Partner Violence:     Fear of Current or Ex-Partner:     Emotionally Abused:     Physically Abused:     Sexually Abused:            ROS:  [x] All negative/unchanged except if checked.  Explain positive(checked items) below:  [] Constitutional  [] Eyes  [] Ear/Nose/Mouth/Throat  [] Respiratory  [] CV  [] GI  []   [] Musculoskeletal  [] Skin/Breast  [] Neurological  [] Endocrine  [] Heme/Lymph  [] Allergic/Immunologic    Explanation:     MEDICATIONS:    Current Facility-Administered Medications:     traZODone (DESYREL) tablet 50 mg, 50 mg, Oral, Nightly, Padmaja Young MD    influenza quadrivalent split vaccine (FLUZONE;FLUARIX;FLULAVAL;AFLURIA) injection 0.5 mL, 0.5 mL, IntraMUSCular, Prior to discharge, Chase Granda MD    mirtazapine (REMERON) tablet 15 mg, 15 mg, Oral, Nightly, Chase Granda MD, 15 mg at 10/03/21 2044    acetaminophen (TYLENOL) tablet 650 mg, 650 mg, Oral, Q4H PRN, Chase Granda MD, 650 mg at 10/03/21 4853    polyethylene glycol (GLYCOLAX) packet 17 g, 17 g, Oral, Daily PRN, Selma Wise MD    nicotine (NICODERM CQ) 21 MG/24HR 1 patch, 1 patch, TransDERmal, Daily, Selma Wise MD    haloperidol lactate (HALDOL) injection 5 mg, 5 mg, IntraMUSCular, Q6H PRN **OR** haloperidol (HALDOL) tablet 5 mg, 5 mg, Oral, Q6H PRN, Selma Wise MD    hydrOXYzine (VISTARIL) injection 50 mg, 50 mg, IntraMUSCular, Q6H PRN **OR** hydrOXYzine (VISTARIL) capsule 50 mg, 50 mg, Oral, Q6H PRN, Selma Wise MD      Examination:  /71 Comment: standing 124/95  Pulse 90   Temp 98.1 °F (36.7 °C) (Oral)   Resp 16   Ht 6' (1.829 m)   Wt 200 lb (90.7 kg)   SpO2 97%   BMI 27.12 kg/m²   Gait - steady  Medication side effects(SE): no    Mental Status Examination:    Level of consciousness:  within normal limits   Appearance:  fair grooming and fair hygiene  Behavior/Motor:  psychomotor retardation  Attitude toward examiner:  cooperative  Speech:  slow   Mood: anxious and depressed  Affect:  blunted  Thought processes:  slow   Thought content:  Suicidal Ideation:  active  Cognition:  oriented to person, place, and time   Concentration poor  Insight poor   Judgement poor     ASSESSMENT:   Patient symptoms are:  [] Well controlled  [] Improving  [] Worsening  [x] No change      Diagnosis:     Depressive disorder, NOS   Cocaine Induced Mood and psychotic disorder  Stimulant (cocaine) use disorder         LABS:    Recent Labs     10/02/21  1424   WBC 12.7*   HGB 15.3        Recent Labs     10/02/21  1424      K 3.7      CO2 25   BUN 18   CREATININE 1.19   GLUCOSE 107*     Recent Labs     10/02/21  1424   BILITOT 0.6   ALKPHOS 90   AST 19   ALT 19     Lab Results   Component Value Date    LABAMPH Neg 10/02/2021    BARBSCNU Neg 10/02/2021    LABBENZ Neg 10/02/2021    LABMETH Neg 10/02/2021    OPIATESCREENURINE Neg 10/02/2021    PHENCYCLIDINESCREENURINE Neg 10/02/2021    ETOH <10 10/02/2021     No results found for: TSH, FREET4  No results found for: LITHIUM  No results found for: VALPROATE, CBMZ    RISK ASSESSMENT: high suicide risk    Treatment Plan:  Reviewed current Medications with the patient. Risks, benefits, side effects, drug-to-drug interactions and alternatives to treatment were discussed. Collateral information:   CD evaluation  Encourage patient to attend group and other milieu activities.   Discharge planning discussed with the patient and treatment team.    PSYCHOTHERAPY/COUNSELING:  [x] Therapeutic interview  [x] Supportive  [] CBT  [] Ongoing  [] Other    [x] Patient continues to need, on a daily basis, active treatment furnished directly by or requiring the supervision of inpatient psychiatric personnel      Anticipated Length of stay:            Electronically signed by Olivier Hinson MD on 10/4/2021 at 11:00 AM

## 2021-10-04 NOTE — PROGRESS NOTES
Pt. declined to attend the 0900 community meeting, despite staff encouragement.  Electronically signed by Jennifer Triplett on 10/4/2021 at 9:28 AM

## 2021-10-04 NOTE — GROUP NOTE
Group Therapy Note    Date: 10/3/2021    Group Start Time: 1630  Group End Time: 1700  Group Topic: Healthy Living/Wellness    [facility]    Denita Jordan        Group Therapy Note    Attendees: 12/18         Patient's Goal:  To learn about and practice guided meditation. Notes:  Patient actively participated in group.     Status After Intervention:  Unchanged    Participation Level: Interactive    Participation Quality: Appropriate and Attentive      Speech:  normal      Thought Process/Content: Logical      Affective Functioning: Flat      Mood: euthymic      Level of consciousness:  Alert and Attentive      Response to Learning: Progressing to goal      Endings: None Reported    Modes of Intervention: Education      Discipline Responsible: [address], [email or web address]      Signature:  Denita Jordan

## 2021-10-04 NOTE — GROUP NOTE
Group Therapy Note    Date: 10/4/2021    Group Start Time: 1630  Group End Time: 1700  Group Topic: Healthy Living/Wellness    [facility] ALEA Arguelles        Group Therapy Note    Attendees: 9/12         Patient's Goal:  To learn about nutrition and healthy eating. Notes:  Patient participated in group discussion.      Status After Intervention:  Unchanged    Participation Level: Interactive    Participation Quality: Appropriate and Attentive      Speech:  normal      Thought Process/Content: Linear      Affective Functioning: Congruent      Mood: euthymic      Level of consciousness:  Alert and Attentive      Response to Learning: Progressing to goal      Endings: None Reported    Modes of Intervention: Education      Discipline Responsible: [address], [employer]      Signature:  Marguerite Arguelles

## 2021-10-04 NOTE — PROGRESS NOTES
Pt visible on unit. Tends to keep to self while in day room. Pt reports that he is here because he \"went crazy while using the drugs\". Explains he has been using for 30+ years. Reports he has lost his home because he was spending his money on drugs vs. paying rent. States his number one goal is to get off drugs. Agreeable to work with [facility] at this time. His second goal is to obtain housing. Pt states all of his family and friends are in [place] and that he has no support here. Rates his depression 2/10 and feels helpless regarding his situation. Denies any SI or HI. Admits to AVH. Explains he hears voices calling his name and sees shadows and lights flashing sometimes. Encouraged to attend groups; continues to decline. Currently in day room watching television.

## 2021-10-04 NOTE — GROUP NOTE
Group Therapy Note    Date: 10/3/2021    Group Start Time: 1945  Group End Time: 2015  Group Topic: Recreational    [facility]    Marguerite Arguelles        Group Therapy Note    Attendees: 11/18         Patient's Goal:  To play [employer] with the group. Notes:  Patient watched peers play the game. Status After Intervention:  Unchanged    Participation Level:  Active Listener    Participation Quality: Appropriate and Attentive      Speech:  normal      Thought Process/Content: Logical      Affective Functioning: Flat      Mood: euthymic      Level of consciousness:  Alert and Attentive      Response to Learning: Progressing to goal      Endings: None Reported    Modes of Intervention: Activity      Discipline Responsible: [address], [employer]      Signature:  Marguerite Arguelles

## 2021-10-04 NOTE — PROGRESS NOTES
Patient visible on unit, keeps to self. Pt watching TV with peers. Patient has flat affect. Patient states he is here because he was using too much crack, but he does want to stop and remain clean. Patient states he would like to go to counseling. Pt states he is homeless, has no support, and has no transportation, but is hopeful to go to a shelter when discharged. Pt reports poor sleep, good appetite. Pt denies SI, HI, and Hallucinations.  Electronically signed by Jaems Vázquez RN on 10/4/21 at 4:33 PM EDT

## 2021-10-04 NOTE — GROUP NOTE
Group Therapy Note    Date: 10/4/2021    Group Start Time: 1400  Group End Time: 5406  Group Topic: Cognitive Skills    [facility]    SHANE Street        Group Therapy Note    Attendees: 9         Patient's Goal: To participate in mood management group. Notes:  Patient learned about the cycle of anxiety. Status After Intervention:  Unchanged    Participation Level: Active Listener    Participation Quality: Attentive      Speech:  normal      Thought Process/Content: Logical      Affective Functioning: Blunted and Flat      Mood: depressed      Level of consciousness:  Attentive      Response to Learning: Progressing to goal      Endings: None Reported    Modes of Intervention: Education      Discipline Responsible: /Counselor      Signature:   SHANE Street

## 2021-10-05 PROCEDURE — 99232 SBSQ HOSP IP/OBS MODERATE 35: CPT | Performed by: PSYCHIATRY & NEUROLOGY

## 2021-10-05 PROCEDURE — 1240000000 HC EMOTIONAL WELLNESS R&B

## 2021-10-05 PROCEDURE — 6370000000 HC RX 637 (ALT 250 FOR IP): Performed by: PSYCHIATRY & NEUROLOGY

## 2021-10-05 RX ADMIN — QUETIAPINE FUMARATE 50 MG: 50 TABLET ORAL at 21:24

## 2021-10-05 RX ADMIN — MIRTAZAPINE 15 MG: 15 TABLET, FILM COATED ORAL at 21:24

## 2021-10-05 NOTE — PROGRESS NOTES
Pt. declined to attend the 0900 community meeting, despite staff encouragement. Electronically signed by Delmas Runner, [address] on 10/5/2021 at 9:58 AM

## 2021-10-05 NOTE — PROGRESS NOTES
Pt minimally social with staff and peers, pt attending groups, eating meals and snacks, reports good sleep. Anxiety and depression remain. Denies SI,HI,AVH. suicidal thoughts come and go none at this time.

## 2021-10-05 NOTE — GROUP NOTE
Group Therapy Note    Date: 10/5/2021    Group Start Time: 1300  Group End Time: 3852  Group Topic: Healthy Living/Wellness    [facility]    Sydney Durbin RN        Group Therapy Note    Attendees: 9         Patient's Goal:  Explore contributing factors to self esteem and how to improve self esteem    Notes:  Pt resistantly participated in group. Status After Intervention:  Unchanged    Participation Level:  Active Listener    Participation Quality: Resistant      Speech:  soft      Thought Process/Content: Logical      Affective Functioning: Flat and Constricted/Restricted      Mood: depressed      Level of consciousness:  Alert and Oriented x4      Response to Learning: Capable of insight, Progressing to goal and Resistant      Endings: None Reported    Modes of Intervention: Education, Support, Socialization, Exploration, Clarifying, Problem-solving and Activity      Discipline Responsible: Registered Nurse      Signature:  Sydney Durbin RN

## 2021-10-05 NOTE — PROGRESS NOTES
Spiritual Support Group Note    Number of Participants in Group: 10                       Time: 15:00-15:45    Goal: Relief from isolation and loneliness             Keyona Sharing             Self-understanding and gain insight              Acceptance and belonging            Recognize they are not alone                Socialization             Empowerment       Encouragement    Topic:  [] Spiritual Wellness and Self Care                  [] Hope                     [x] Connecting with Divine/Others        [] Thankfulness and Gratitude               []  Meaningfulness and Purpose               [] Forgiveness               [] Peace               [] Connect to [employer]      [] Other    Participation Level:   [x] Active Listener   [] Minimal   [] Monopolizing   [] Interactive   [] No Participation   []  Other:     Attention:   [x] Alert   [] Distractible   [] Drowsy   [] Poor   [] Other:    Manner:   [x] Cooperative   [] Suspicious   [] Withdrawn   [] Guarded   [] Irritable   [] Inhospitable   [] Other:     Others Comments from Group:

## 2021-10-05 NOTE — GROUP NOTE
Group Therapy Note    Date: 10/5/2021    Group Start Time: 1400  Group End Time: 1087  Group Topic: Psychotherapy    [facility]    ADELA Sifuentes LSW        Group Therapy Note    Attendees: 10         Patient's Goal:  To participate in a coping skills oriented group    Notes:  Patient likes to watch TV as a distraction. Status After Intervention:  Improved    Participation Level:  Active Listener and Interactive    Participation Quality: Appropriate      Speech:  normal      Thought Process/Content: Logical      Affective Functioning: Congruent      Mood: calm      Level of consciousness:  Alert      Response to Learning: Able to verbalize current knowledge/experience      Endings: None Reported    Modes of Intervention: Education      Discipline Responsible: /Counselor      Signature:  ADELA Sifuentes LSW

## 2021-10-05 NOTE — GROUP NOTE
Group Therapy Note    Date: 10/5/2021    Group Start Time: 1630  Group End Time: 9162  Group Topic: Healthy Living/Wellness    [facility]    Elisa Amaro RN        Group Therapy Note    Attendees: 11/15         Patient's Goal:  To attend healthy living group    Notes:  Pt participated    Status After Intervention:  Unchanged    Participation Level:  Active Listener and Interactive    Participation Quality: Appropriate, Attentive, Sharing and Supportive      Speech:  normal      Thought Process/Content: Logical  Linear      Affective Functioning: Congruent      Mood: WNL      Level of consciousness:  Alert, Oriented x4 and Attentive      Response to Learning: Able to verbalize current knowledge/experience, Able to verbalize/acknowledge new learning, Able to retain information, Capable of insight and Progressing to goal      Endings: None Reported    Modes of Intervention: Education      Discipline Responsible: Registered Nurse      Signature:  Elisa Amaro RN

## 2021-10-05 NOTE — PROGRESS NOTES
Pt. refused to attend the 1000 skills group, despite staff encouragement. Electronically signed by Edith Gonzalez, [address] on 10/5/2021 at 11:28 AM

## 2021-10-05 NOTE — GROUP NOTE
Group Therapy Note    Date: 10/4/2021    Group Start Time: 2045  Group End Time: 2115  Group Topic: Recreational    [facility]    Inés Medrano        Group Therapy Note    Attendees: 11/13         Patient's Goal:  To play Heads Up with the group. Notes:  Patient opted not to play the game with peers. Patient sat quietly and ate ice cream while watching peers play. Status After Intervention:  Unchanged    Participation Level:  Active Listener    Participation Quality: Attentive      Speech:  hesitant      Thought Process/Content: Logical      Affective Functioning: Congruent      Mood: euthymic      Level of consciousness:  Alert and Attentive      Response to Learning: Progressing to goal      Endings: None Reported    Modes of Intervention: Activity      Discipline Responsible: [address], [employer]      Signature:  Inés Medrano

## 2021-10-05 NOTE — GROUP NOTE
Group Therapy Note    Date: 10/4/2021    Group Start Time: 2115  Group End Time: 2130  Group Topic: Wrap-Up    [facility]    Denita Jordan        Group Therapy Note    Attendees: 11/13         Patient's Goal:  \"try sleeping\"    Notes:  Patient reported not meeting their goal for the day yet, but is hopeful to meet it tonight. Patient shared \"nothing bad\" happened today, and having issues getting hold of his friend on the phone. This tech assisted patient after group and patient was thankful.     Status After Intervention:  Unchanged    Participation Level: Interactive    Participation Quality: Appropriate, Attentive and Sharing      Speech:  normal      Thought Process/Content: Logical      Affective Functioning: Congruent      Mood: euthymic      Level of consciousness:  Alert and Attentive      Response to Learning: Progressing to goal      Endings: None Reported    Modes of Intervention: Support      Discipline Responsible: [employer]      Signature:  Denita Jordan

## 2021-10-06 PROCEDURE — 6370000000 HC RX 637 (ALT 250 FOR IP): Performed by: PSYCHIATRY & NEUROLOGY

## 2021-10-06 PROCEDURE — 1240000000 HC EMOTIONAL WELLNESS R&B

## 2021-10-06 PROCEDURE — 99232 SBSQ HOSP IP/OBS MODERATE 35: CPT | Performed by: PSYCHIATRY & NEUROLOGY

## 2021-10-06 RX ADMIN — ACETAMINOPHEN 650 MG: 325 TABLET ORAL at 13:04

## 2021-10-06 RX ADMIN — QUETIAPINE FUMARATE 50 MG: 50 TABLET ORAL at 21:01

## 2021-10-06 RX ADMIN — MIRTAZAPINE 15 MG: 15 TABLET, FILM COATED ORAL at 21:01

## 2021-10-06 ASSESSMENT — PAIN SCALES - GENERAL
PAINLEVEL_OUTOF10: 6
PAINLEVEL_OUTOF10: 4

## 2021-10-06 NOTE — GROUP NOTE
Group Therapy Note    Date: 10/6/2021    Group Start Time: 2260  Group End Time: 1520  Group Topic: Healthy Living/Wellness    [facility] ANTONIA Wagoner RN; Gordon Spencer RN        Group Therapy Note    Attendees: 10/14       Patient's Goal:  Self esteem    Notes:      Status After Intervention:  Improved    Participation Level:  Active Listener    Participation Quality: Appropriate      Speech:  normal      Thought Process/Content: Logical      Affective Functioning: Congruent      Mood: euthymic      Level of consciousness:  Alert      Response to Learning: Progressing to goal      Endings: None Reported    Modes of Intervention: Education      Discipline Responsible: Registered Nurse      Signature:  Gordon Spencer RN

## 2021-10-06 NOTE — GROUP NOTE
Group Therapy Note    Date: 10/5/2021    Group Start Time: 2115  Group End Time: 2130  Group Topic: Wrap-Up    [facility]    Jignesh Rawls RN        Group Therapy Note    Attendees: 10/15         Patient's Goal:  To feel better      Notes:  Pt states he met his goal today    Status After Intervention:  Improved    Participation Level:  Active Listener and Interactive    Participation Quality: Appropriate, Attentive, Sharing and Supportive      Speech:  normal      Thought Process/Content: Logical  Linear      Affective Functioning: Congruent      Mood: WNL      Level of consciousness:  Alert, Oriented x4 and Attentive      Response to Learning: Able to retain information, Capable of insight, Able to change behavior and Progressing to goal      Endings: None Reported    Modes of Intervention: Support and Socialization      Discipline Responsible: Registered Nurse      Signature:  Jignesh Rawls RN

## 2021-10-06 NOTE — GROUP NOTE
Group Therapy Note    Date: 10/5/2021    Group Start Time: 2100  Group End Time: 2115  Group Topic: Recreational    [facility]    Lorrie Lora RN        Group Therapy Note    Attendees: 10/15         Patient's Goal:  To attend recreation group    Notes:  Pt participated    Status After Intervention:  Improved    Participation Level:  Active Listener and Interactive    Participation Quality: Appropriate, Attentive, Sharing and Supportive      Speech:  normal      Thought Process/Content: Logical  Linear      Affective Functioning: Congruent      Mood: WNL      Level of consciousness:  Alert, Oriented x4 and Attentive      Response to Learning: Able to retain information and Capable of insight      Endings: None Reported    Modes of Intervention: Socialization and Activity      Discipline Responsible: Registered Nurse      Signature:  Lorrie Lora RN

## 2021-10-06 NOTE — GROUP NOTE
Group Therapy Note    Date: 10/6/2021    Group Start Time: 1350  Group End Time: 8496  Group Topic: Cognitive Skills    [facility]    SHANE Acosta        Group Therapy Note    Attendees: 10         Patient's Goal:  Learn Anxiety: Tips for Coping. Notes:  Patient sat quietly and made gestures with his head only during the group. Status After Intervention:  Improved    Participation Level:  Active Listener    Participation Quality: Attentive      Speech:  normal      Thought Process/Content: Logical      Affective Functioning: Congruent      Mood: calm      Level of consciousness:  Alert      Response to Learning: Capable of insight      Endings: None Reported    Modes of Intervention: Education      Discipline Responsible: /Counselor      Signature:  SHANE Acosta

## 2021-10-06 NOTE — PROGRESS NOTES
Pt. attended the 0900 community meeting. Electronically signed by Lane Lopez [address] on 10/6/2021 at 11:56 AM

## 2021-10-06 NOTE — GROUP NOTE
Group Therapy Note    Date: 10/6/2021    Group Start Time: 1630  Group End Time: 8771  Group Topic: Recreational    [facility]    Daksha Mahan        Group Therapy Note    Attendees: 12/15         Patient's Goal:  To play or watch peers play corn hole. Notes:  Patient actively participated in group activity.     Status After Intervention:  Improved    Participation Level: Interactive    Participation Quality: Appropriate, Attentive and Supportive      Speech:  normal      Thought Process/Content: Logical      Affective Functioning: Congruent      Mood: euthymic      Level of consciousness:  Alert and Attentive      Response to Learning: Progressing to goal      Endings: None Reported    Modes of Intervention: Activity      Discipline Responsible: [employer]      Signature:  Daksha Mahan

## 2021-10-06 NOTE — GROUP NOTE
Group Therapy Note    Date: 10/6/2021    Group Start Time: 1000  Group End Time: 1055  Group Topic: Psychoeducation    [facility]    Frank Perkins        Group Therapy Note    Attendees: 12         Patient's Goal:  \"To relax\"    Notes:  Patient attended the 1000 skills group. He had a flat affect, he was quiet and did not interact with his peers. He work fairly on his task.     Status After Intervention:  Unchanged    Participation Level: Fair    Participation Quality: Appropriate      Speech:  quiet      Thought Process/Content: Linear      Affective Functioning: Flat      Mood: calm      Level of consciousness:  Alert      Response to Learning: Progressing to goal      Endings: None Reported    Modes of Intervention: Education, Socialization and Activity      Discipline Responsible: Psychoeducational Specialist      Signature:  Frank Perkins

## 2021-10-06 NOTE — PROGRESS NOTES
Pt out in the dining area this morning not seen social with peers. Patient sleep is \"terrible\"  Patient encouraged to speak with the Dr. About his sleep and also let night staff know if he is having trouble sleeping. Patient nods in understanding. Patient denies current SI but does have fleeting thoughts. Patient appetite is good. Patient dep and anx are \"still there\"  But better. Patient denies HI or AVH.  Electronically signed by Katalina Saravia LPN on 51/9/7754 at 78:66 AM

## 2021-10-07 PROCEDURE — 6370000000 HC RX 637 (ALT 250 FOR IP): Performed by: PSYCHIATRY & NEUROLOGY

## 2021-10-07 PROCEDURE — 99231 SBSQ HOSP IP/OBS SF/LOW 25: CPT | Performed by: PSYCHIATRY & NEUROLOGY

## 2021-10-07 PROCEDURE — 1240000000 HC EMOTIONAL WELLNESS R&B

## 2021-10-07 PROCEDURE — 6370000000 HC RX 637 (ALT 250 FOR IP): Performed by: NURSE PRACTITIONER

## 2021-10-07 PROCEDURE — 90833 PSYTX W PT W E/M 30 MIN: CPT | Performed by: PSYCHIATRY & NEUROLOGY

## 2021-10-07 RX ORDER — LOSARTAN POTASSIUM 50 MG/1
50 TABLET ORAL DAILY
Status: DISCONTINUED | OUTPATIENT
Start: 2021-10-07 | End: 2021-10-08 | Stop reason: HOSPADM

## 2021-10-07 RX ADMIN — QUETIAPINE FUMARATE 50 MG: 50 TABLET ORAL at 20:50

## 2021-10-07 RX ADMIN — LOSARTAN POTASSIUM 50 MG: 50 TABLET, FILM COATED ORAL at 14:05

## 2021-10-07 RX ADMIN — MIRTAZAPINE 15 MG: 15 TABLET, FILM COATED ORAL at 20:50

## 2021-10-07 NOTE — PROGRESS NOTES
Pt. refused to attend the 1000 skills group, despite staff encouragement. Electronically signed by Aida Luo, [address] on 10/7/2021 at 12:13 PM

## 2021-10-07 NOTE — PROGRESS NOTES
Jaswinder Oconnor [facility]. FOLLOW-UP NOTE            Patient was seen and examined in person, Chart reviewed   Patient's case discussed with staff/team    Chief Complaint: Depression    Interim History:     Continue to make progress  Less depressed  Pt was referred to [facility]- got accepted to the program   Pt denies any active SI today  Slept better    Appetite:   [x] Normal/Unchanged  [] Increased  [] Decreased      Sleep:       [] Normal/Unchanged  [x] Fair       [] Poor              Energy:    [] Normal/Unchanged  [] Increased  [x] Decreased        SI [] Present  [x] Absent    HI  []Present  [x] Absent     Aggression:  [] yes  [] no    Patient is [] able  [] unable to CONTRACT FOR SAFETY     PAST MEDICAL/PSYCHIATRIC HISTORY:   Past Medical History:   Diagnosis Date    Psychiatric problem        FAMILY/SOCIAL HISTORY:  Family History   Problem Relation Age of Onset    No Known Problems Mother     No Known Problems Father     No Known Problems Sister     No Known Problems Brother     No Known Problems Maternal Aunt     No Known Problems Maternal Uncle     No Known Problems Paternal Aunt     No Known Problems Paternal Uncle     No Known Problems Maternal Grandmother     No Known Problems Maternal Grandfather     No Known Problems Paternal Grandmother     No Known Problems Paternal Grandfather     No Known Problems Maternal Cousin     No Known Problems Paternal Cousin     No Known Problems Other      Social History     Socioeconomic History    Marital status: Legally      Spouse name: Not on file    Number of children: 0    Years of education: 15    Highest education level: Not on file   Occupational History    Not on file   Tobacco Use    Smoking status: Current Every Day Smoker     Packs/day: 0.50     Types: Cigarettes    Smokeless tobacco: Never Used   Vaping Use    Vaping Use: Never used   Substance and Sexual Activity    Alcohol use: Not on file  Drug use: Yes     Comment: uses crack daily    Sexual activity: Not on file   Other Topics Concern    Not on file   Social History Narrative    Not on file     Social Determinants of Health     Financial Resource Strain:     Difficulty of Paying Living Expenses:    Food Insecurity:     Worried About Running Out of Food in the Last Year:     [address] in the Last Year:    Transportation Needs:     Lack of Transportation (Medical):  Lack of Transportation (Non-Medical):    Physical Activity:     Days of Exercise per Week:     Minutes of Exercise per Session:    Stress:     Feeling of Stress :    Social Connections:     Frequency of Communication with Friends and Family:     Frequency of Social Gatherings with Friends and Family:     Attends Holiness Services:     Active Member of Clubs or Organizations:     Attends Club or Organization Meetings:     Marital Status:    Intimate Partner Violence:     Fear of Current or Ex-Partner:     Emotionally Abused:     Physically Abused:     Sexually Abused:            ROS:  [x] All negative/unchanged except if checked.  Explain positive(checked items) below:  [] Constitutional  [] Eyes  [] Ear/Nose/Mouth/Throat  [] Respiratory  [] CV  [] GI  []   [] Musculoskeletal  [] Skin/Breast  [] Neurological  [] Endocrine  [] Heme/Lymph  [] Allergic/Immunologic    Explanation:     MEDICATIONS:    Current Facility-Administered Medications:     losartan (COZAAR) tablet 50 mg, 50 mg, Oral, Daily, EDITH Luu - CNP, 50 mg at 10/07/21 1405    QUEtiapine (SEROQUEL) tablet 50 mg, 50 mg, Oral, Nightly, Carolann Schwartz MD, 50 mg at 10/06/21 2101    influenza quadrivalent split vaccine (FLUZONE;FLUARIX;FLULAVAL;AFLURIA) injection 0.5 mL, 0.5 mL, IntraMUSCular, Prior to discharge, Chandler Brooks MD    mirtazapine (REMERON) tablet 15 mg, 15 mg, Oral, Nightly, Chandler Brooks MD, 15 mg at 10/06/21 2101    acetaminophen (TYLENOL) tablet 650 mg, 650 mg, Oral, Q4H PRN, Junito Alfred MD, 650 mg at 10/06/21 1304    polyethylene glycol (GLYCOLAX) packet 17 g, 17 g, Oral, Daily PRN, Junito Alfred MD    nicotine (NICODERM CQ) 21 MG/24HR 1 patch, 1 patch, TransDERmal, Daily, Junito Alfred MD    haloperidol lactate (HALDOL) injection 5 mg, 5 mg, IntraMUSCular, Q6H PRN **OR** haloperidol (HALDOL) tablet 5 mg, 5 mg, Oral, Q6H PRN, Junito Alfred MD    hydrOXYzine (VISTARIL) injection 50 mg, 50 mg, IntraMUSCular, Q6H PRN **OR** hydrOXYzine (VISTARIL) capsule 50 mg, 50 mg, Oral, Q6H PRN, Junito Alfred MD      Examination:  BP (!) 154/87   Pulse 74   Temp 99 °F (37.2 °C)   Resp 20   Ht 6' (1.829 m)   Wt 200 lb (90.7 kg)   SpO2 98%   BMI 27.12 kg/m²   Gait - steady  Medication side effects(SE): no    Mental Status Examination:    Level of consciousness:  within normal limits   Appearance:  fair grooming and fair hygiene  Behavior/Motor: less psychomotor retardation  Attitude toward examiner:  cooperative  Speech:  slow   Mood: less anxious and depressed  Affect:  blunted  Thought processes:  slow   Thought content:  Suicidal Ideation:  denies  Cognition:  oriented to person, place, and time   Concentration better  Insight better   Judgement poor     ASSESSMENT:   Patient symptoms are:  [] Well controlled  [x] Improving  [] Worsening  [] No change      Diagnosis:     Depressive disorder, NOS   Cocaine Induced Mood and psychotic disorder  Stimulant (cocaine) use disorder         LABS:    No results for input(s): WBC, HGB, PLT in the last 72 hours. No results for input(s): NA, K, CL, CO2, BUN, CREATININE, GLUCOSE in the last 72 hours. No results for input(s): BILITOT, ALKPHOS, AST, ALT in the last 72 hours.   Lab Results   Component Value Date    LABAMPH Neg 10/02/2021    BARBSCNU Neg 10/02/2021    LABBENZ Neg 10/02/2021    LABMETH Neg 10/02/2021    OPIATESCREENURINE Neg 10/02/2021    PHENCYCLIDINESCREENURINE Neg 10/02/2021    ETOH <10 10/02/2021     No results found for: TSH, FREET4  No results found for: LITHIUM  No results found for: VALPROATE, CBMZ        Treatment Plan:  Reviewed current Medications with the patient. Risks, benefits, side effects, drug-to-drug interactions and alternatives to treatment were discussed. Collateral information:   CD evaluation  Encourage patient to attend group and other milieu activities. Discharge planning discussed with the patient and treatment team.    PSYCHOTHERAPY/COUNSELING:  [x] Therapeutic interview  [x] Supportive  [] CBT  [] Ongoing  [] Other  Patient was seen 1:1 for 20 minutes, other than E&M time spent, focusing on      - coping skills techniques     - Anxiety management techniques discussed including deep breathing exercise and PMR     - discussing patients strength and weakness      - Motivational interviewing to assess the stage of change and assessing patient readiness to quit substance use.      - Focusing on negative cognition and maladaptive thoughts, which is feeding and maintaining the depression symptoms    [x] Patient continues to need, on a daily basis, active treatment furnished directly by or requiring the supervision of inpatient psychiatric personnel      Anticipated Length of stay:            Electronically signed by Sandhya Russell MD

## 2021-10-07 NOTE — GROUP NOTE
Group Therapy Note    Date: 10/7/2021    Group Start Time: 1833  Group End Time: 1700  Group Topic: Healthy Living/Wellness    [facility]    Jayy Gonzales        Group Therapy Note    Attendees: 10/16         Patient's Goal:  To learn how to live a healthy lifestyle. Topics covered included forgiveness and support systems. Notes:  Patient participated in group.     Status After Intervention:  Unchanged    Participation Level: Interactive    Participation Quality: Appropriate and Attentive      Speech:  normal      Thought Process/Content: Logical      Affective Functioning: Congruent      Mood: euthymic      Level of consciousness:  Alert and Attentive      Response to Learning: Progressing to goal      Endings: None Reported    Modes of Intervention: Education      Discipline Responsible: [address], [employer]      Signature:  Jayy Gonzales

## 2021-10-07 NOTE — PROGRESS NOTES
Jaswinder Oconnor [facility] 89. FOLLOW-UP NOTE            Patient was seen and examined in person, Chart reviewed   Patient's case discussed with staff/team    Chief Complaint: Depression    Interim History:     Pt starting to feel better  Admit to addiction being his main stressor  Want to go to long term rehab  Pt was referred to [facility]- awaiting confirmation  Pt denies any active SI today  Slept better    Appetite:   [x] Normal/Unchanged  [] Increased  [] Decreased      Sleep:       [] Normal/Unchanged  [x] Fair       [] Poor              Energy:    [] Normal/Unchanged  [] Increased  [x] Decreased        SI [] Present  [x] Absent    HI  []Present  [x] Absent     Aggression:  [] yes  [] no    Patient is [] able  [] unable to CONTRACT FOR SAFETY     PAST MEDICAL/PSYCHIATRIC HISTORY:   Past Medical History:   Diagnosis Date    Psychiatric problem        FAMILY/SOCIAL HISTORY:  Family History   Problem Relation Age of Onset    No Known Problems Mother     No Known Problems Father     No Known Problems Sister     No Known Problems Brother     No Known Problems Maternal Aunt     No Known Problems Maternal Uncle     No Known Problems Paternal Aunt     No Known Problems Paternal Uncle     No Known Problems Maternal Grandmother     No Known Problems Maternal Grandfather     No Known Problems Paternal Grandmother     No Known Problems Paternal Grandfather     No Known Problems Maternal Cousin     No Known Problems Paternal Cousin     No Known Problems Other      Social History     Socioeconomic History    Marital status: Legally      Spouse name: Not on file    Number of children: 0    Years of education: 15    Highest education level: Not on file   Occupational History    Not on file   Tobacco Use    Smoking status: Current Every Day Smoker     Packs/day: 0.50     Types: Cigarettes    Smokeless tobacco: Never Used   Vaping Use    Vaping Use: Never used Substance and Sexual Activity    Alcohol use: Not on file    Drug use: Yes     Comment: uses crack daily    Sexual activity: Not on file   Other Topics Concern    Not on file   Social History Narrative    Not on file     Social Determinants of Health     Financial Resource Strain:     Difficulty of Paying Living Expenses:    Food Insecurity:     Worried About Running Out of Food in the Last Year:     [address] in the Last Year:    Transportation Needs:     Lack of Transportation (Medical):  Lack of Transportation (Non-Medical):    Physical Activity:     Days of Exercise per Week:     Minutes of Exercise per Session:    Stress:     Feeling of Stress :    Social Connections:     Frequency of Communication with Friends and Family:     Frequency of Social Gatherings with Friends and Family:     Attends Taoism Services:     Active Member of Clubs or Organizations:     Attends Club or Organization Meetings:     Marital Status:    Intimate Partner Violence:     Fear of Current or Ex-Partner:     Emotionally Abused:     Physically Abused:     Sexually Abused:            ROS:  [x] All negative/unchanged except if checked.  Explain positive(checked items) below:  [] Constitutional  [] Eyes  [] Ear/Nose/Mouth/Throat  [] Respiratory  [] CV  [] GI  []   [] Musculoskeletal  [] Skin/Breast  [] Neurological  [] Endocrine  [] Heme/Lymph  [] Allergic/Immunologic    Explanation:     MEDICATIONS:    Current Facility-Administered Medications:     losartan (COZAAR) tablet 50 mg, 50 mg, Oral, Daily, EDITH Loco CNP, 50 mg at 10/07/21 1405    QUEtiapine (SEROQUEL) tablet 50 mg, 50 mg, Oral, Nightly, Dante Prieto MD, 50 mg at 10/06/21 2101    influenza quadrivalent split vaccine (FLUZONE;FLUARIX;FLULAVAL;AFLURIA) injection 0.5 mL, 0.5 mL, IntraMUSCular, Prior to discharge, Esha Roman MD    mirtazapine (REMERON) tablet 15 mg, 15 mg, Oral, Nightly, Esha Roman MD, 15 mg at 10/06/21 2101    acetaminophen (TYLENOL) tablet 650 mg, 650 mg, Oral, Q4H PRN, Pj Palacios MD, 650 mg at 10/06/21 1304    polyethylene glycol (GLYCOLAX) packet 17 g, 17 g, Oral, Daily PRN, Pj Palacios MD    nicotine (NICODERM CQ) 21 MG/24HR 1 patch, 1 patch, TransDERmal, Daily, Pj Palacios MD    haloperidol lactate (HALDOL) injection 5 mg, 5 mg, IntraMUSCular, Q6H PRN **OR** haloperidol (HALDOL) tablet 5 mg, 5 mg, Oral, Q6H PRN, Pj Palacios MD    hydrOXYzine (VISTARIL) injection 50 mg, 50 mg, IntraMUSCular, Q6H PRN **OR** hydrOXYzine (VISTARIL) capsule 50 mg, 50 mg, Oral, Q6H PRN, Pj Palacios MD      Examination:  BP (!) 154/87   Pulse 74   Temp 99 °F (37.2 °C)   Resp 20   Ht 6' (1.829 m)   Wt 200 lb (90.7 kg)   SpO2 98%   BMI 27.12 kg/m²   Gait - steady  Medication side effects(SE): no    Mental Status Examination:    Level of consciousness:  within normal limits   Appearance:  fair grooming and fair hygiene  Behavior/Motor: less psychomotor retardation  Attitude toward examiner:  cooperative  Speech:  slow   Mood: less anxious and depressed  Affect:  blunted  Thought processes:  slow   Thought content:  Suicidal Ideation:  denies  Cognition:  oriented to person, place, and time   Concentration better  Insight better   Judgement poor     ASSESSMENT:   Patient symptoms are:  [] Well controlled  [x] Improving  [] Worsening  [] No change      Diagnosis:     Depressive disorder, NOS   Cocaine Induced Mood and psychotic disorder  Stimulant (cocaine) use disorder         LABS:    No results for input(s): WBC, HGB, PLT in the last 72 hours. No results for input(s): NA, K, CL, CO2, BUN, CREATININE, GLUCOSE in the last 72 hours. No results for input(s): BILITOT, ALKPHOS, AST, ALT in the last 72 hours.   Lab Results   Component Value Date    LABAMPH Neg 10/02/2021    BARBSCNU Neg 10/02/2021    LABBENZ Neg 10/02/2021    LABMETH Neg 10/02/2021    OPIATESCREENURINE Neg 10/02/2021 PHENCYCLIDINESCREENURINE Neg 10/02/2021    ETOH <10 10/02/2021     No results found for: TSH, FREET4  No results found for: LITHIUM  No results found for: VALPROATE, CBMZ        Treatment Plan:  Reviewed current Medications with the patient. Risks, benefits, side effects, drug-to-drug interactions and alternatives to treatment were discussed. Collateral information:   CD evaluation  Encourage patient to attend group and other milieu activities.   Discharge planning discussed with the patient and treatment team.    PSYCHOTHERAPY/COUNSELING:  [x] Therapeutic interview  [x] Supportive  [] CBT  [] Ongoing  [] Other    [x] Patient continues to need, on a daily basis, active treatment furnished directly by or requiring the supervision of inpatient psychiatric personnel      Anticipated Length of stay:            Electronically signed by Bailey Mendez MD

## 2021-10-07 NOTE — FLOWSHEET NOTE
Pt out at TV area most of day. Reports poor sleep last night, doesn't know why. Reports waking up 3x's through night. Pt denies depression. Pt has minimal anxiety. Pt reports he is doing good, just resting. Pt out at TV at this time. Pt reports good appetite. Pt denies SI/HI/AVH.

## 2021-10-07 NOTE — PROGRESS NOTES
Jaswinder Oconnor [facility]. FOLLOW-UP NOTE            Patient was seen and examined in person, Chart reviewed   Patient's case discussed with staff/team    Chief Complaint: Depression    Interim History:     Pt report feeling slightly better  Sleeping deep in his room  Difficult to talk with him  Still admit to having suicidal thoughts    Appetite:   [] Normal/Unchanged  [] Increased  [x] Decreased      Sleep:       [] Normal/Unchanged  [] Fair       [x] Poor              Energy:    [] Normal/Unchanged  [] Increased  [x] Decreased        SI [x] Present  [] Absent    HI  []Present  [x] Absent     Aggression:  [] yes  [] no    Patient is [] able  [] unable to CONTRACT FOR SAFETY     PAST MEDICAL/PSYCHIATRIC HISTORY:   Past Medical History:   Diagnosis Date    Psychiatric problem        FAMILY/SOCIAL HISTORY:  Family History   Problem Relation Age of Onset    No Known Problems Mother     No Known Problems Father     No Known Problems Sister     No Known Problems Brother     No Known Problems Maternal Aunt     No Known Problems Maternal Uncle     No Known Problems Paternal Aunt     No Known Problems Paternal Uncle     No Known Problems Maternal Grandmother     No Known Problems Maternal Grandfather     No Known Problems Paternal Grandmother     No Known Problems Paternal Grandfather     No Known Problems Maternal Cousin     No Known Problems Paternal Cousin     No Known Problems Other      Social History     Socioeconomic History    Marital status: Legally      Spouse name: Not on file    Number of children: 0    Years of education: 15    Highest education level: Not on file   Occupational History    Not on file   Tobacco Use    Smoking status: Current Every Day Smoker     Packs/day: 0.50     Types: Cigarettes    Smokeless tobacco: Never Used   Vaping Use    Vaping Use: Never used   Substance and Sexual Activity    Alcohol use: Not on file    Drug use:  Yes Comment: uses crack daily    Sexual activity: Not on file   Other Topics Concern    Not on file   Social History Narrative    Not on file     Social Determinants of Health     Financial Resource Strain:     Difficulty of Paying Living Expenses:    Food Insecurity:     Worried About Running Out of Food in the Last Year:     [address] in the Last Year:    Transportation Needs:     Lack of Transportation (Medical):  Lack of Transportation (Non-Medical):    Physical Activity:     Days of Exercise per Week:     Minutes of Exercise per Session:    Stress:     Feeling of Stress :    Social Connections:     Frequency of Communication with Friends and Family:     Frequency of Social Gatherings with Friends and Family:     Attends Congregational Services:     Active Member of Clubs or Organizations:     Attends Club or Organization Meetings:     Marital Status:    Intimate Partner Violence:     Fear of Current or Ex-Partner:     Emotionally Abused:     Physically Abused:     Sexually Abused:            ROS:  [x] All negative/unchanged except if checked.  Explain positive(checked items) below:  [] Constitutional  [] Eyes  [] Ear/Nose/Mouth/Throat  [] Respiratory  [] CV  [] GI  []   [] Musculoskeletal  [] Skin/Breast  [] Neurological  [] Endocrine  [] Heme/Lymph  [] Allergic/Immunologic    Explanation:     MEDICATIONS:    Current Facility-Administered Medications:     losartan (COZAAR) tablet 50 mg, 50 mg, Oral, Daily, EDITH Kingston CNP, 50 mg at 10/07/21 1405    QUEtiapine (SEROQUEL) tablet 50 mg, 50 mg, Oral, Nightly, Carter Garvey MD, 50 mg at 10/06/21 2101    influenza quadrivalent split vaccine (FLUZONE;FLUARIX;FLULAVAL;AFLURIA) injection 0.5 mL, 0.5 mL, IntraMUSCular, Prior to discharge, Sangeeta Raymond MD    mirtazapine (REMERON) tablet 15 mg, 15 mg, Oral, Nightly, Sangeeta Raymond MD, 15 mg at 10/06/21 2101    acetaminophen (TYLENOL) tablet 650 mg, 650 mg, Oral, Q4H PRN, Amna Enamorado Savanna Alva MD, 650 mg at 10/06/21 1304    polyethylene glycol (GLYCOLAX) packet 17 g, 17 g, Oral, Daily PRN, Hong Irby MD    nicotine (NICODERM CQ) 21 MG/24HR 1 patch, 1 patch, TransDERmal, Daily, Hong Irby MD    haloperidol lactate (HALDOL) injection 5 mg, 5 mg, IntraMUSCular, Q6H PRN **OR** haloperidol (HALDOL) tablet 5 mg, 5 mg, Oral, Q6H PRN, Hong Irby MD    hydrOXYzine (VISTARIL) injection 50 mg, 50 mg, IntraMUSCular, Q6H PRN **OR** hydrOXYzine (VISTARIL) capsule 50 mg, 50 mg, Oral, Q6H PRN, Hong Irby MD      Examination:  BP (!) 154/87   Pulse 74   Temp 99 °F (37.2 °C)   Resp 20   Ht 6' (1.829 m)   Wt 200 lb (90.7 kg)   SpO2 98%   BMI 27.12 kg/m²   Gait - steady  Medication side effects(SE): no    Mental Status Examination:    Level of consciousness:  within normal limits   Appearance:  fair grooming and fair hygiene  Behavior/Motor:  psychomotor retardation  Attitude toward examiner:  cooperative  Speech:  slow   Mood: anxious and depressed  Affect:  blunted  Thought processes:  slow   Thought content:  Suicidal Ideation:  active  Cognition:  oriented to person, place, and time   Concentration poor  Insight poor   Judgement poor     ASSESSMENT:   Patient symptoms are:  [] Well controlled  [] Improving  [] Worsening  [x] No change      Diagnosis:     Depressive disorder, NOS   Cocaine Induced Mood and psychotic disorder  Stimulant (cocaine) use disorder         LABS:    No results for input(s): WBC, HGB, PLT in the last 72 hours. No results for input(s): NA, K, CL, CO2, BUN, CREATININE, GLUCOSE in the last 72 hours. No results for input(s): BILITOT, ALKPHOS, AST, ALT in the last 72 hours.   Lab Results   Component Value Date    LABAMPH Neg 10/02/2021    BARBSCNU Neg 10/02/2021    LABBENZ Neg 10/02/2021    LABMETH Neg 10/02/2021    OPIATESCREENURINE Neg 10/02/2021    PHENCYCLIDINESCREENURINE Neg 10/02/2021    ETOH <10 10/02/2021     No results found for: TSH, FREET4  No results found for: LITHIUM  No results found for: VALPROATE, CBMZ    RISK ASSESSMENT: high suicide risk    Treatment Plan:  Reviewed current Medications with the patient. Risks, benefits, side effects, drug-to-drug interactions and alternatives to treatment were discussed. Collateral information:   CD evaluation  Encourage patient to attend group and other milieu activities.   Discharge planning discussed with the patient and treatment team.    PSYCHOTHERAPY/COUNSELING:  [x] Therapeutic interview  [x] Supportive  [] CBT  [] Ongoing  [] Other    [x] Patient continues to need, on a daily basis, active treatment furnished directly by or requiring the supervision of inpatient psychiatric personnel      Anticipated Length of stay:            Electronically signed by Charles Vallecillo MD

## 2021-10-07 NOTE — GROUP NOTE
Group Therapy Note    Date: 10/6/2021    Group Start Time: 1900  Group End Time: 1930  Group Topic: Healthy Living/Wellness    [facility]    Shellie Hollis RN        Group Therapy Note    Attendees: 9/15         Patient's Goal:  To attend AA group    Notes:  Pt participated    Status After Intervention:  Unchanged    Participation Level:  Active Listener and Interactive    Participation Quality: Appropriate, Attentive, Sharing and Supportive      Speech:  normal      Thought Process/Content: Logical  Linear      Affective Functioning: Congruent      Mood: WNL      Level of consciousness:  Alert, Oriented x4 and Attentive      Response to Learning: Capable of insight      Endings: None Reported    Modes of Intervention: Support      Discipline Responsible: Registered Nurse      Signature:  Shellie Hollis RN

## 2021-10-07 NOTE — PROGRESS NOTES
Pt. declined to attend the 0900 community meeting, despite staff encouragement. Electronically signed by Cheo Finley [address] on 10/7/2021 at 9:36 AM

## 2021-10-07 NOTE — GROUP NOTE
Group Therapy Note    Date: 10/6/2021    Group Start Time: 2100  Group End Time: 2115  Group Topic: Wrap-Up    [facility] ALEA Dick RN        Group Therapy Note    Attendees: 10/15         Patient's Goal:  Pt states he does not have a goal    Notes:  Pt states he had a good day today    Status After Intervention:  Improved    Participation Level:  Active Listener and Interactive    Participation Quality: Appropriate, Attentive, Sharing and Supportive      Speech:  normal      Thought Process/Content: Logical  Linear      Affective Functioning: Congruent      Mood: WNL      Level of consciousness:  Alert, Oriented x4 and Attentive      Response to Learning: Capable of insight and Progressing to goal      Endings: None Reported    Modes of Intervention: Support      Discipline Responsible: Registered Nurse      Signature:  Camille Dick RN

## 2021-10-08 VITALS
DIASTOLIC BLOOD PRESSURE: 97 MMHG | OXYGEN SATURATION: 97 % | HEIGHT: 72 IN | BODY MASS INDEX: 27.09 KG/M2 | RESPIRATION RATE: 18 BRPM | SYSTOLIC BLOOD PRESSURE: 146 MMHG | TEMPERATURE: 97.3 F | HEART RATE: 68 BPM | WEIGHT: 200 LBS

## 2021-10-08 PROCEDURE — 86147 CARDIOLIPIN ANTIBODY EA IG: CPT

## 2021-10-08 PROCEDURE — 99239 HOSP IP/OBS DSCHRG MGMT >30: CPT | Performed by: PSYCHIATRY & NEUROLOGY

## 2021-10-08 PROCEDURE — 6370000000 HC RX 637 (ALT 250 FOR IP): Performed by: NURSE PRACTITIONER

## 2021-10-08 RX ORDER — LOSARTAN POTASSIUM 50 MG/1
50 TABLET ORAL DAILY
Qty: 30 TABLET | Refills: 3 | Status: SHIPPED | OUTPATIENT
Start: 2021-10-09

## 2021-10-08 RX ORDER — MIRTAZAPINE 15 MG/1
15 TABLET, FILM COATED ORAL NIGHTLY
Qty: 30 TABLET | Refills: 1 | Status: SHIPPED | OUTPATIENT
Start: 2021-10-08

## 2021-10-08 RX ORDER — QUETIAPINE FUMARATE 50 MG/1
50 TABLET, FILM COATED ORAL NIGHTLY
Qty: 30 TABLET | Refills: 3 | Status: SHIPPED | OUTPATIENT
Start: 2021-10-08

## 2021-10-08 RX ADMIN — LOSARTAN POTASSIUM 50 MG: 50 TABLET, FILM COATED ORAL at 08:48

## 2021-10-08 NOTE — SUICIDE SAFETY PLAN
SAFETY PLAN    A suicide Safety Plan is a document that supports someone when they are having thoughts of suicide. Warning Signs that indicate a suicidal crisis may be developing: What (situations, thoughts, feelings, body sensations, behaviors, etc.) do you experience that lets you know you are beginning to think about suicide? 1. Decrease appetite  2. Decrease sleep  3. pain    Internal Coping Strategies:  What things can I do (relaxation techniques, hobbies, physical activities, etc.) to take my mind off my problems without contacting another person? 1. TV   2. music  3. Play basketball    People and social settings that provide distraction: Who can I call or where can I go to distract me? 1. Name: friends  Phone: has#  2. Name: coworkers  Phone: has#   3. Place: movies                People whom I can ask for help: Who can I call when I need help - for example, friends, family, clergy, someone else? 1. Name: my family             Phone: has#  2. Name: my friends  Phone: has#      Professionals or [facility] agencies I can contact during a crisis: Who can I call for help - for example, my doctor, my psychiatrist, my psychologist, a mental health provider, a suicide hotline? 1. Clinician Name: [facility]   Phone: [phone]    2. Suicide Prevention Lifeline: 9-856-811-TALK (5883)    3. [address] [place] [address] Emergency Services -  for example, [facility] suicide hotline, [facility] Hotline: 389      Emergency Services Address: [address] [place] [address]      Emergency Services Phone: [phone]    Making the environment safe: How can I make my environment (house/apartment/living space) safer? For example, can I remove guns, medications, and other items? 1. Be alone  2.  Take medications

## 2021-10-08 NOTE — CARE COORDINATION
10/3/2021 @ 1452 - Patient was approached regarding completing the Leisure Assessment. Patient presented as cooperative and engaged. When asked about his top three leisure activities, patient stated he likes to watch TV, movies, and sports. Patient is currently homeless and looking for stable housing. In addition, patient is looking to be placed in a rehab. His strengths are positive support and recognizing he has a drug problem.     Electronically signed by Valencia Goldmann on 10/3/2021 at 3:18 PM
2nd attempt to call patients friend for collateral, the phone rang busy. Approached patient and asked if he had another person that we could contact and he said no.
Discharge instructions reviewed verbally and in writing including f/u appointments. Patient verbalizes understanding and signed as such. All belongings returned for discharge. Patient denies SI, HI, A/V hallucinations, mood is stable.    Discharged with cab voucher for transport to the [address] [place] [address] for stay on their [facility] unit
FAMILY COLLATERAL NOTE    Family/Support Name: Agusto Rajan  Contact #: [phone]  Relationship to Pt: friend      Placed call to above for collateral. Phone rang busy. Was unable to speak to friend.      Response:  Was unable to gather collateral.         [facility]
Group Therapy Note    Date: 10/6/2021  Start Time: 1100  End Time:  5068  Number of Participants: 12    Type of Group: Psychotherapy    Wellness Binder Information  Module Name:    Session Number:      Patient's Goal:  Not to self sabotage     Notes:  Pt was active in group     Status After Intervention:  Unchanged    Participation Level:  Active Listener    Participation Quality: Appropriate      Speech:  normal      Thought Process/Content: Logical      Affective Functioning: Congruent      Mood: depressed      Level of consciousness:  Oriented x4      Response to Learning: Able to retain information      Endings: None Reported    Modes of Intervention: Education      Discipline Responsible: /Counselor      Signature:  Anthony Strickland
Made referral to [facility]. They will send a peer supporter to the unit to speak to patient.
Patient did not attend 10am group despite staff encouragement.
Pt did not attend 11am group despite staff encouragement.
Pt did not attend group despite staff encouragement.
Pt reports having suicidal thoughts off and on, but is safe here. \"I'm either going to take my life or change my life. \"  Pt reports going to an inpatient rehab 3 times and he knows what he needs to do, it's just a matter of doing it. Pt restless during assessment and paced a little bit. 7/10 depression and 8/10 anxiety. Reports good appetite. \"I eat to much. \"
Take medications as prescribed. 2. Ask for help via support system when needed.      .Electronically signed by SHANE Latif on 10/3/2021 at 3:44 PM
patient. During  interview, patient made good eye contact and this worker was able to communicate with patient through .      .Electronically signed by [address] on 10/3/2021 at 4:17 PM

## 2021-10-08 NOTE — PROGRESS NOTES
Pt is offered the flu shot and refuses at this time.  Electronically signed by Alen Moore LPN on 69/6/7034 at 5:08 AM

## 2021-10-08 NOTE — PROGRESS NOTES
Pt. refused to attend the 1000 skills group, despite staff encouragement.  Electronically signed by Rey Oliva on 10/8/2021 at 12:03 PM

## 2021-10-08 NOTE — DISCHARGE SUMMARY
DISCHARGE SUMMARY      Patient ID:  Edvin Angelo  [id number]  40 y.o.  1960      Admit date: 10/2/2021    Discharge date and time: 10/8/2021    Admitting Physician: Lyly Caal MD     Discharge Physician: Dr Claudene Dance MD    Admission Diagnoses: Nondependent cocaine abuse ([facility] [address] [F14.10]  Major depression with psychotic features [facility]) [F32.3]    Admission Condition: poor    Discharged Condition: stable    Admission Circumstance:     Mr. Edvin Angelo is a 64 y.o. male with a history of addiction who presented to the ER with c/o depressed mood, suicidal ideation and auditory hallucinations. Per ER notes, \"Pt called 911 and came to the hospital for S/I and hearing voices.  Pt admits to using Cocaine/crack before he came to the ER.  Pt has been using Crack for \"a lot of years on and off. \"  Pt has no delusions, no paranoia, hearing voices that tell him to kill himself and hurt \"\"others\"  \"I don't want to hurt anyone but the voices tell me to cut my wrists and hurt other people but I don't want to harm anyone but yes kill myself. \"  Pt has no H/O abuse of alcohol drinks occasionally. [facility] was negative for alcohol screen and positive for Cocaine.  Pt thinks he lost his job of two years as he did not work past three days and he was \"Kicked out of my apartment yesterday. \"  No other psychosis noted other than A/hallucinations, no phobia's, no delusions, no V/hallucinations. Pt refused to talk with [facility]\"      HISTORY OF PRESENT ILLNESS:       The patient is a 64 y.o. male with significant past history of drug use, who presented to the ER with c/o depressed mood and suicidal ideation as well as auditory hallucinations. When interviewed today, the patient said he had \"used too much crack' and \"wanted to kill himself\". He said he had a lot of problems, lost his house and his job. He has been depressed for about 6 months. He said he has been having suicidal ideation \"every time he smokes crack\", but \"can't control himself. He said his sleep is \"not good\" because he wakes up a lot and that his appetite is normal. He admitted to having auditory hallucinations, \"sometimes\", telling him to harm himself and others, the last time being 2 days ago, as well as visual hallucinations of \"flashes\" like someone is passing by. He said he has been \"seeing the Devil\" in his sleep. He said he does get paranoid when he gets high on crack cocaine.  He denied homicidal ideation.            Stressors: no     The patient is not currently receiving care for the above psychiatric illness.     Medications Prior to Admission:   Prescriptions Prior to Admission   No medications prior to admission.        Compliance: n/a     Psychiatric Review of Systems       Depression: yes     Elle or Hypomania:  no     Panic Attacks:  no     Phobias:  no     Obsessions and Compulsions:  no     PTSD : no     Hallucinations:  yes - auditory and visual as above     Delusions:  yes - paranoia as above     Substance Abuse History:  ETOH: yes   Marijuana: yes  Opiates: no  Other Drugs: crack cocaine        Past Psychiatric History:  Prior Diagnosis:  n/a  Psychiatrist: no  Therapist:no  Hospitalization: no  Hx of Suicidal Attempts: no  Hx of violence:  no  ECT: no  Previous discontinued Psychiatric Med Trials: n/a        PAST MEDICAL/PSYCHIATRIC HISTORY:   Past Medical History:   Diagnosis Date    Psychiatric problem        FAMILY/SOCIAL HISTORY:  Family History   Problem Relation Age of Onset    No Known Problems Mother     No Known Problems Father     No Known Problems Sister     No Known Problems Brother     No Known Problems Maternal Aunt     No Known Problems Maternal Uncle     No Known Problems Paternal Aunt     No Known Problems Paternal Uncle     No Known Problems Maternal Grandmother     No Known Problems Maternal Grandfather     No Known Problems Paternal Grandmother     No Known Problems Paternal Grandfather     No Known Problems Maternal Cousin     No Known Problems Paternal Cousin     No Known Problems Other      Social History     Socioeconomic History    Marital status: Legally      Spouse name: Not on file    Number of children: 0    Years of education: 15    Highest education level: Not on file   Occupational History    Not on file   Tobacco Use    Smoking status: Current Every Day Smoker     Packs/day: 0.50     Types: Cigarettes    Smokeless tobacco: Never Used   Vaping Use    Vaping Use: Never used   Substance and Sexual Activity    Alcohol use: Not on file    Drug use: Yes     Comment: uses crack daily    Sexual activity: Not on file   Other Topics Concern    Not on file   Social History Narrative    Not on file     Social Determinants of Health     Financial Resource Strain:     Difficulty of Paying Living Expenses:    Food Insecurity:     Worried About Running Out of Food in the Last Year:     Ran Out of Food in the Last Year:    Transportation Needs:     Lack of Transportation (Medical):      Lack of Transportation (Non-Medical):    Physical Activity:     Days of Exercise per Week:     Minutes of Exercise per Session:    Stress:     Feeling of Stress :    Social Connections:     Frequency of Communication with Friends and Family:     Frequency of Social Gatherings with Friends and Family:     Attends Orthodox Services:     Active Member of Clubs or Organizations:     Attends Club or Organization Meetings:     Marital Status:    Intimate Partner Violence:     Fear of Current or Ex-Partner:     Emotionally Abused:     Physically Abused:     Sexually Abused:        MEDICATIONS:    Current Facility-Administered Medications:     losartan (COZAAR) tablet 50 mg, 50 mg, Oral, Daily, EDITH Quintero CNP, 50 mg at 10/08/21 0848    QUEtiapine (SEROQUEL) tablet 50 mg, 50 mg, Oral, Nightly, Dannielle Poon MD, 50 mg at 10/07/21 2050    influenza quadrivalent split vaccine (FLUZONE;FLUARIX;FLULAVAL;AFLURIA) injection 0.5 mL, 0.5 mL, IntraMUSCular, Prior to discharge, Chino Armendariz MD    mirtazapine (REMERON) tablet 15 mg, 15 mg, Oral, Nightly, Chino Armendariz MD, 15 mg at 10/07/21 2050    acetaminophen (TYLENOL) tablet 650 mg, 650 mg, Oral, Q4H PRN, Chino Armendariz MD, 650 mg at 10/06/21 1304    polyethylene glycol (GLYCOLAX) packet 17 g, 17 g, Oral, Daily PRN, Chino Armendariz MD    nicotine (NICODERM CQ) 21 MG/24HR 1 patch, 1 patch, TransDERmal, Daily, Chino Armendariz MD    haloperidol lactate (HALDOL) injection 5 mg, 5 mg, IntraMUSCular, Q6H PRN **OR** haloperidol (HALDOL) tablet 5 mg, 5 mg, Oral, Q6H PRN, Chino Armendariz MD    hydrOXYzine (VISTARIL) injection 50 mg, 50 mg, IntraMUSCular, Q6H PRN **OR** hydrOXYzine (VISTARIL) capsule 50 mg, 50 mg, Oral, Q6H PRN, Chnio Armendariz MD    Examination:  BP (!) 146/97   Pulse 68   Temp 97.3 °F (36.3 °C) (Oral)   Resp 18   Ht 6' (1.829 m)   Wt 200 lb (90.7 kg)   SpO2 97%   BMI 27.12 kg/m²   Gait - steady    HOSPITAL COURSE[de-identified]  Following admission to the hospital, patient had a complete physical exam and blood work up  Patient was monitored closely with suicide precaution  Patient was started on medication as listed below  Was encouraged to participate in group and other milieu activity  Patient started to feel better with this combination of treatment. Significant progress in the symptoms since admission. Mood better, with the score of 2/10 - bad  No AVH or paranoid thoughts  No Hopeless or worthless feeling  No active SI/HI  Appetite:  [x] Normal  [] Increased  [] Decreased    Sleep:       [x] Normal  [] Fair       [] Poor            Energy:    [x] Normal  [] Increased  [] Decreased     SI [] Present  [x] Absent  HI  []Present  [x] Absent   Aggression:  [] yes  [] no  Patient is [x] able  [] unable to CONTRACT FOR SAFETY   Medication side effects(SE):  [x] None(Psych.  Meds.) [] Other    Pt got [facility] for [facility]  Pt is motivated to seek help    Mental Status Examination on discharge:    Level of consciousness:  within normal limits   Appearance:  well-appearing  Behavior/Motor:  no abnormalities noted  Attitude toward examiner:  attentive and good eye contact  Speech:  spontaneous, normal rate and normal volume   Mood: euthymic  Affect:  blunted  Thought processes:  goal directed   Thought content:  Suicidal Ideation:  denies suicidal ideation  Cognition:  oriented to person, place, and time   Concentration intact  Memory intact  Insight good   Judgement fair   Fund of Knowledge adequate      ASSESSMENT:  Patient symptoms are:  [x] Well controlled  [x] Improving  [] Worsening  [] No change      Diagnosis:  Active Problems:    Major depression with psychotic features ([facility] [address]  Resolved Problems:    * No resolved hospital problems. *  Cocaine and alcohol use disorder    LABS:    No results for input(s): WBC, HGB, PLT in the last 72 hours. No results for input(s): NA, K, CL, CO2, BUN, CREATININE, GLUCOSE in the last 72 hours. No results for input(s): BILITOT, ALKPHOS, AST, ALT in the last 72 hours. Lab Results   Component Value Date    LABAMPH Neg 10/02/2021    BARBSCNU Neg 10/02/2021    LABBENZ Neg 10/02/2021    LABMETH Neg 10/02/2021    OPIATESCREENURINE Neg 10/02/2021    PHENCYCLIDINESCREENURINE Neg 10/02/2021    ETOH <10 10/02/2021     No results found for: TSH, FREET4  No results found for: LITHIUM  No results found for: VALPROATE, CBMZ    RISK ASSESSMENT AT DISCHARGE: Low risk for suicide and homicide. Treatment Plan:  Reviewed current Medications with the patient. Education provided on the complaince with treatment. Risks, benefits, side effects, drug-to-drug interactions and alternatives to treatment were discussed. Encourage patient to attend outpatient follow up appointment and therapy. Patient was advised to call the outpatient provider, visit the nearest ED or call 911 if symptoms are not manageable.      Patient's family member was contacted prior to the discharge.          Medication List      START taking these medications    losartan 50 MG tablet  Commonly known as: COZAAR  Take 1 tablet by mouth daily  Start taking on: October 9, 2021     mirtazapine 15 MG tablet  Commonly known as: REMERON  Take 1 tablet by mouth nightly     QUEtiapine 50 MG tablet  Commonly known as: SEROQUEL  Take 1 tablet by mouth nightly           Where to Get Your Medications      These medications were sent to The [address] [place] [address] [phone]  [address] [place] [address]    Phone: [phone]   · losartan 50 MG tablet  · mirtazapine 15 MG tablet  · QUEtiapine 50 MG tablet           Reason for more than one antipsychotic:   [x] N/A  [] 3 failed monotherapy(drugs tried):  [] Cross over to a new antipsychotic  [] Taper to monotherapy from polypharmacy  [] Augmentation of Clozapine therapy due to treatment resistance to single therapy        TIME SPEND - 35 MINUTES TO COMPLETE THE EVALUATION, DISCHARGE SUMMARY, MEDICATION RECONCILIATION AND FOLLOW UP CARE     Signed:  Titi Bhat MD  10/8/2021  9:39 AM

## 2021-10-08 NOTE — PROGRESS NOTES
Patient attended [facility] group at 9 PM as active participant and verbalizes understanding of educational material presented.

## 2021-10-08 NOTE — PROGRESS NOTES
Pt. attended the 0900 community meeting.  Electronically signed by Tres Coronel on 10/8/2021 at 9:41 AM

## 2021-10-08 NOTE — PROGRESS NOTES
Patient attended  8 PM recreational group as active participant and verbalizes understanding of eduactional materials presented.

## 2021-10-10 LAB
ANTICARDIOLIPIN IGG ANTIBODY: <10 GPL (ref 0–14)
CARDIOLIPIN AB IGM: <10 MPL (ref 0–12)